# Patient Record
Sex: FEMALE | Race: WHITE | NOT HISPANIC OR LATINO | Employment: FULL TIME | URBAN - METROPOLITAN AREA
[De-identification: names, ages, dates, MRNs, and addresses within clinical notes are randomized per-mention and may not be internally consistent; named-entity substitution may affect disease eponyms.]

---

## 2017-01-22 ENCOUNTER — HOSPITAL ENCOUNTER (EMERGENCY)
Facility: MEDICAL CENTER | Age: 52
End: 2017-01-22
Attending: EMERGENCY MEDICINE
Payer: COMMERCIAL

## 2017-01-22 VITALS
RESPIRATION RATE: 14 BRPM | TEMPERATURE: 97.2 F | HEART RATE: 85 BPM | OXYGEN SATURATION: 98 % | DIASTOLIC BLOOD PRESSURE: 87 MMHG | SYSTOLIC BLOOD PRESSURE: 123 MMHG | WEIGHT: 190.26 LBS | HEIGHT: 65 IN | BODY MASS INDEX: 31.7 KG/M2

## 2017-01-22 DIAGNOSIS — L03.313 CELLULITIS OF CHEST WALL: ICD-10-CM

## 2017-01-22 LAB
BASOPHILS # BLD AUTO: 0.4 % (ref 0–1.8)
BASOPHILS # BLD: 0.03 K/UL (ref 0–0.12)
EOSINOPHIL # BLD AUTO: 0.08 K/UL (ref 0–0.51)
EOSINOPHIL NFR BLD: 1 % (ref 0–6.9)
ERYTHROCYTE [DISTWIDTH] IN BLOOD BY AUTOMATED COUNT: 44.2 FL (ref 35.9–50)
HCT VFR BLD AUTO: 37 % (ref 37–47)
HGB BLD-MCNC: 11.9 G/DL (ref 12–16)
IMM GRANULOCYTES # BLD AUTO: 0.03 K/UL (ref 0–0.11)
IMM GRANULOCYTES NFR BLD AUTO: 0.4 % (ref 0–0.9)
LYMPHOCYTES # BLD AUTO: 1.24 K/UL (ref 1–4.8)
LYMPHOCYTES NFR BLD: 15.5 % (ref 22–41)
MCH RBC QN AUTO: 26.8 PG (ref 27–33)
MCHC RBC AUTO-ENTMCNC: 32.2 G/DL (ref 33.6–35)
MCV RBC AUTO: 83.3 FL (ref 81.4–97.8)
MONOCYTES # BLD AUTO: 0.52 K/UL (ref 0–0.85)
MONOCYTES NFR BLD AUTO: 6.5 % (ref 0–13.4)
NEUTROPHILS # BLD AUTO: 6.08 K/UL (ref 2–7.15)
NEUTROPHILS NFR BLD: 76.2 % (ref 44–72)
NRBC # BLD AUTO: 0 K/UL
NRBC BLD AUTO-RTO: 0 /100 WBC
PLATELET # BLD AUTO: 334 K/UL (ref 164–446)
PMV BLD AUTO: 8.8 FL (ref 9–12.9)
RBC # BLD AUTO: 4.44 M/UL (ref 4.2–5.4)
WBC # BLD AUTO: 8 K/UL (ref 4.8–10.8)

## 2017-01-22 PROCEDURE — 700111 HCHG RX REV CODE 636 W/ 250 OVERRIDE (IP): Performed by: EMERGENCY MEDICINE

## 2017-01-22 PROCEDURE — 85025 COMPLETE CBC W/AUTO DIFF WBC: CPT

## 2017-01-22 PROCEDURE — 87040 BLOOD CULTURE FOR BACTERIA: CPT | Mod: 91

## 2017-01-22 PROCEDURE — 99284 EMERGENCY DEPT VISIT MOD MDM: CPT

## 2017-01-22 PROCEDURE — 96365 THER/PROPH/DIAG IV INF INIT: CPT

## 2017-01-22 PROCEDURE — 700105 HCHG RX REV CODE 258: Performed by: EMERGENCY MEDICINE

## 2017-01-22 PROCEDURE — 36415 COLL VENOUS BLD VENIPUNCTURE: CPT

## 2017-01-22 RX ORDER — DESVENLAFAXINE 100 MG/1
100 TABLET, EXTENDED RELEASE ORAL DAILY
COMMUNITY

## 2017-01-22 RX ORDER — TAMOXIFEN CITRATE 20 MG/1
20 TABLET ORAL DAILY
COMMUNITY

## 2017-01-22 RX ORDER — CEFTRIAXONE 500 MG/1
500 INJECTION, POWDER, FOR SOLUTION INTRAMUSCULAR; INTRAVENOUS ONCE
Status: SHIPPED | COMMUNITY
End: 2017-03-31

## 2017-01-22 RX ORDER — CEPHALEXIN 500 MG/1
500 CAPSULE ORAL 4 TIMES DAILY
Qty: 40 CAP | Refills: 0 | Status: SHIPPED | OUTPATIENT
Start: 2017-01-22 | End: 2017-02-01

## 2017-01-22 RX ORDER — MORPHINE SULFATE 4 MG/ML
4 INJECTION, SOLUTION INTRAMUSCULAR; INTRAVENOUS ONCE
Status: DISCONTINUED | OUTPATIENT
Start: 2017-01-22 | End: 2017-01-22 | Stop reason: HOSPADM

## 2017-01-22 RX ORDER — AMPICILLIN AND SULBACTAM 2; 1 G/1; G/1
3 INJECTION, POWDER, FOR SOLUTION INTRAMUSCULAR; INTRAVENOUS ONCE
Status: COMPLETED | OUTPATIENT
Start: 2017-01-22 | End: 2017-01-22

## 2017-01-22 RX ORDER — CEPHALEXIN 500 MG/1
500 CAPSULE ORAL 4 TIMES DAILY
Status: SHIPPED | COMMUNITY
End: 2017-03-31

## 2017-01-22 RX ORDER — SODIUM CHLORIDE 9 MG/ML
1000 INJECTION, SOLUTION INTRAVENOUS ONCE
Status: COMPLETED | OUTPATIENT
Start: 2017-01-22 | End: 2017-01-22

## 2017-01-22 RX ADMIN — SODIUM CHLORIDE 1000 ML: 900 INJECTION, SOLUTION INTRAVENOUS at 12:12

## 2017-01-22 RX ADMIN — AMPICILLIN SODIUM AND SULBACTAM SODIUM 3 G: 2; 1 INJECTION, POWDER, FOR SOLUTION INTRAMUSCULAR; INTRAVENOUS at 12:09

## 2017-01-22 ASSESSMENT — PAIN SCALES - GENERAL: PAINLEVEL_OUTOF10: 4

## 2017-01-22 NOTE — ED NOTES
Blood and BC x 2 drawn adn to lab.  NS/ IV abx infusing.  Pt watching TV in no distress w/ call light in reach.  Aware to call for any needs/changes.

## 2017-01-22 NOTE — ED NOTES
"Chief Complaint   Patient presents with   • Breast Pain     Left, started one week ago, has been seen here for same   • Breast Swelling     Left, started one week ago, treated w/ antibx     /98 mmHg  Pulse 95  Temp(Src) 36.2 °C (97.2 °F)  Resp 16  Ht 1.651 m (5' 5\")  Wt 86.3 kg (190 lb 4.1 oz)  BMI 31.66 kg/m2  SpO2 97%  LMP 01/18/2016     Pt states has been here several times before and receives antibx treatment for Cellulitis  "

## 2017-01-22 NOTE — DISCHARGE INSTRUCTIONS
Return immediately to the emergency department at the first sign of worsening infection including increasing redness, increasing pain, pus in the wound, fever or if you have any other concerns.      Cellulitis  Cellulitis is an infection of the skin and the tissue beneath it. The infected area is usually red and tender. Cellulitis occurs most often in the arms and lower legs.   CAUSES   Cellulitis is caused by bacteria that enter the skin through cracks or cuts in the skin. The most common types of bacteria that cause cellulitis are staphylococci and streptococci.  SIGNS AND SYMPTOMS   · Redness and warmth.  · Swelling.  · Tenderness or pain.  · Fever.  DIAGNOSIS   Your health care provider can usually determine what is wrong based on a physical exam. Blood tests may also be done.  TREATMENT   Treatment usually involves taking an antibiotic medicine.  HOME CARE INSTRUCTIONS   · Take your antibiotic medicine as directed by your health care provider. Finish the antibiotic even if you start to feel better.  · Keep the infected arm or leg elevated to reduce swelling.  · Apply a warm cloth to the affected area up to 4 times per day to relieve pain.  · Take medicines only as directed by your health care provider.  · Keep all follow-up visits as directed by your health care provider.  SEEK MEDICAL CARE IF:   · You notice red streaks coming from the infected area.  · Your red area gets larger or turns dark in color.  · Your bone or joint underneath the infected area becomes painful after the skin has healed.  · Your infection returns in the same area or another area.  · You notice a swollen bump in the infected area.  · You develop new symptoms.  · You have a fever.  SEEK IMMEDIATE MEDICAL CARE IF:   · You feel very sleepy.  · You develop vomiting or diarrhea.  · You have a general ill feeling (malaise) with muscle aches and pains.  MAKE SURE YOU:   · Understand these instructions.  · Will watch your condition.  · Will get  help right away if you are not doing well or get worse.     This information is not intended to replace advice given to you by your health care provider. Make sure you discuss any questions you have with your health care provider.     Document Released: 09/27/2006 Document Revised: 01/08/2016 Document Reviewed: 03/04/2013  Elsevier Interactive Patient Education ©2016 Elsevier Inc.

## 2017-01-22 NOTE — ED AVS SNAPSHOT
1/22/2017          Yina Cortés  [address]  [place] [address]    Dear Yina:    [facility] wants to ensure your discharge home is safe and you or your loved ones have had all your questions answered regarding your care after you leave the hospital.    You may receive a telephone call within two days of your discharge.  This call is to make certain you understand your discharge instructions as well as ensure we provided you with the best care possible during your stay with us.     The call will only last approximately 3-5 minutes and will be done by a nurse.    Once again, we want to ensure your discharge home is safe and that you have a clear understanding of any next steps in your care.  If you have any questions or concerns, please do not hesitate to contact us, we are here for you.  Thank you for choosing [facility] for your healthcare needs.    Sincerely,    Earle Mohan    [facility]

## 2017-01-22 NOTE — ED AVS SNAPSHOT
After Visit Summary                                                                                                                Yina Cortés   MRN: [id number]    Department:  [facility], Emergency Dept   Date of Visit:  1/22/2017            [facility], Emergency Dept    [address]    [place] [address]    Phone:  [phone]      You were seen by     Adal Merida M.D.      Your Diagnosis Was     Cellulitis of chest wall     L03.313       These are the medications you received during your hospitalization from 01/22/2017 1028 to 01/22/2017 1314     Date/Time Order Dose Route Action    01/22/2017 1209 ampicillin/sulbactam (UNASYN) injection 3 g 3 g Intravenous Given    01/22/2017 1200 morphine (pf) 4 mg/ml injection 4 mg 4 mg Intravenous Refused    01/22/2017 1212 NS infusion 1,000 mL 1,000 mL Intravenous New Bag      Follow-up Information     1. Follow up with Ada Acharya M.D. In 1 day.    Specialty:  OB/Gyn    Contact information    [address]  [address]  [place] [id number] [phone]        Medication Information     Review all of your home medications and newly ordered medications with your primary doctor and/or pharmacist as soon as possible. Follow medication instructions as directed by your doctor and/or pharmacist.     Please keep your complete medication list with you and share with your physician. Update the information when medications are discontinued, doses are changed, or new medications (including over-the-counter products) are added; and carry medication information at all times in the event of emergency situations.               Medication List      ASK your doctor about these medications        Instructions    cefTRIAXone 500 MG Solr   Commonly known as:  ROCEPHIN    500 mg by Intramuscular route Once. On Wednesday 1/18/17 and 1/19/17   Dose:  500 mg       * cephALEXin 500 MG Caps   What changed:  Another medication with  the same name was added. Make sure you understand how and when to take each.   Commonly known as:  KEFLEX   Ask about: Which instructions should I use?    Take 500 mg by mouth 4 times a day. Start date: 1/15/2017 For 10 days   Dose:  500 mg       * cephALEXin 500 MG Caps   What changed:  You were already taking a medication with the same name, and this prescription was added. Make sure you understand how and when to take each.   Commonly known as:  KEFLEX   Ask about: Which instructions should I use?    Take 1 Cap by mouth 4 times a day for 10 days.   Dose:  500 mg       PRISTIQ 100 MG Tb24   Generic drug:  Desvenlafaxine Succinate    Take 100 mg by mouth every day.   Dose:  100 mg       tamoxifen 20 MG tablet   Commonly known as:  NOLVADEX    Take 20 mg by mouth every day.   Dose:  20 mg       * Notice:  This list has 2 medication(s) that are the same as other medications prescribed for you. Read the directions carefully, and ask your doctor or other care provider to review them with you.            Procedures and tests performed during your visit     Procedure/Test Number of Times Performed    BLOOD CULTURE 2    CBC WITH DIFFERENTIAL 1        Discharge Instructions       Return immediately to the emergency department at the first sign of worsening infection including increasing redness, increasing pain, pus in the wound, fever or if you have any other concerns.      Cellulitis  Cellulitis is an infection of the skin and the tissue beneath it. The infected area is usually red and tender. Cellulitis occurs most often in the arms and lower legs.   CAUSES   Cellulitis is caused by bacteria that enter the skin through cracks or cuts in the skin. The most common types of bacteria that cause cellulitis are staphylococci and streptococci.  SIGNS AND SYMPTOMS   · Redness and warmth.  · Swelling.  · Tenderness or pain.  · Fever.  DIAGNOSIS   Your health care provider can usually determine what is wrong based on a physical  exam. Blood tests may also be done.  TREATMENT   Treatment usually involves taking an antibiotic medicine.  HOME CARE INSTRUCTIONS   · Take your antibiotic medicine as directed by your health care provider. Finish the antibiotic even if you start to feel better.  · Keep the infected arm or leg elevated to reduce swelling.  · Apply a warm cloth to the affected area up to 4 times per day to relieve pain.  · Take medicines only as directed by your health care provider.  · Keep all follow-up visits as directed by your health care provider.  SEEK MEDICAL CARE IF:   · You notice red streaks coming from the infected area.  · Your red area gets larger or turns dark in color.  · Your bone or joint underneath the infected area becomes painful after the skin has healed.  · Your infection returns in the same area or another area.  · You notice a swollen bump in the infected area.  · You develop new symptoms.  · You have a fever.  SEEK IMMEDIATE MEDICAL CARE IF:   · You feel very sleepy.  · You develop vomiting or diarrhea.  · You have a general ill feeling (malaise) with muscle aches and pains.  MAKE SURE YOU:   · Understand these instructions.  · Will watch your condition.  · Will get help right away if you are not doing well or get worse.     This information is not intended to replace advice given to you by your health care provider. Make sure you discuss any questions you have with your health care provider.     Document Released: 09/27/2006 Document Revised: 01/08/2016 Document Reviewed: 03/04/2013  Elsevier Interactive Patient Education ©2016 [employer]            Patient Information     Patient Information    Following emergency treatment: all patient requiring follow-up care must return either to a private physician or a clinic if your condition worsens before you are able to obtain further medical attention, please return to the emergency room.     Billing Information    At [facility], we work to make the billing  process streamlined for our patients.  Our Representatives are here to answer any questions you may have regarding your hospital bill.  If you have insurance coverage and have supplied your insurance information to us, we will submit a claim to your insurer on your behalf.  Should you have any questions regarding your bill, we can be reached online or by phone as follows:  Online: You are able pay your bills online or live chat with our representatives about any billing questions you may have. We are here to help Monday - Friday from 8:00am to 7:30pm and 9:00am - 12:00pm on Saturdays.  Please visit [email or web address]  for more information.   Phone:  [phone] or [phone]    Please note that your emergency physician, surgeon, pathologist, radiologist, anesthesiologist, and other specialists are not employed by [facility] and will therefore bill separately for their services.  Please contact them directly for any questions concerning their bills at the numbers below:     Emergency Physician Services:  [phone]  [facility]:  [phone]  [facility]:  [phone]  [facility]:  [phone]    1. Your final bill may vary from the amount quoted upon discharge if all procedures are not complete at that time, or if your doctor has additional procedures of which we are not aware. You will receive an additional bill if you return to the Emergency Department at [facility] for suture removal regardless of the facility of which the sutures were placed.     2. Please arrange for settlement of this account at the emergency registration.    3. All self-pay accounts are due in full at the time of treatment.  If you are unable to meet this obligation then payment is expected within 4-5 days.     4. If you have had radiology studies (CT, X-ray, Ultrasound, MRI), you have received a preliminary result during your emergency department  visit. Please contact the radiology department [phone] to receive a copy of your final result. Please discuss the Final result with your primary physician or with the follow up physician provided.     Crisis Hotline:  [place] Crisis Hotline:  4-491-USGBMCO or 1-211.626.1311  [place] Crisis Hotline:    1-492.902.9763 or 478-670-1459         ED Discharge Follow Up Questions    1. In order to provide you with very good care, we would like to follow up with a phone call in the next few days.  May we have your permission to contact you?     YES /  NO    2. What is the best phone number to call you? (       )_____-__________    3. What is the best time to call you?      Morning  /  Afternoon  /  Evening                   Patient Signature:  ____________________________________________________________    Date:  ____________________________________________________________

## 2017-01-22 NOTE — ED AVS SNAPSHOT
zlien Access Code: [email or web address]  Expires: 2/21/2017  1:14 PM    zlien  A secure, online tool to manage your health information     [employer]’s zlien® is a secure, online tool that connects you to your personalized health information from the privacy of your home -- day or night - making it very easy for you to manage your healthcare. Once the activation process is completed, you can even access your medical information using the zlien luz, which is available for free in the Apple Luz store or Google Play store.     zlien provides the following levels of access (as shown below):   My Chart Features   [facility] Primary Care Doctor [facility]  Specialists [facility]  Urgent  Care Non-[facility]  Primary Care  Doctor   Email your healthcare team securely and privately 24/7 X X X X   Manage appointments: schedule your next appointment; view details of past/upcoming appointments X      Request prescription refills. X      View recent personal medical records, including lab and immunizations X X X X   View health record, including health history, allergies, medications X X X X   Read reports about your outpatient visits, procedures, consult and ER notes X X X X   See your discharge summary, which is a recap of your hospital and/or ER visit that includes your diagnosis, lab results, and care plan. X X       How to register for zlien:  1. Go to  [email or web address].  2. Click on the Sign Up Now box, which takes you to the New Member Sign Up page. You will need to provide the following information:  a. Enter your zlien Access Code exactly as it appears at the top of this page. (You will not need to use this code after you’ve completed the sign-up process. If you do not sign up before the expiration date, you must request a new code.)   b. Enter your date of birth.   c. Enter your home email address.   d. Click Submit, and follow the next screen’s instructions.  3. Create a zlien ID. This will be your zlien  login ID and cannot be changed, so think of one that is secure and easy to remember.  4. Create a [employer] password. You can change your password at any time.  5. Enter your Password Reset Question and Answer. This can be used at a later time if you forget your password.   6. Enter your e-mail address. This allows you to receive e-mail notifications when new information is available in [employer].  7. Click Sign Up. You can now view your health information.    For assistance activating your [employer] account, call [phone]

## 2017-01-22 NOTE — ED PROVIDER NOTES
"ED Provider Note    CHIEF COMPLAINT  Chief Complaint   Patient presents with   • Breast Pain     Left, started one week ago, has been seen here for same   • Breast Swelling     Left, started one week ago, treated w/ antibx       HPI  Yina Cortés is a 51 y.o. female who presents with bilateral breast redness and swelling and pain, she has a history of recurring cellulitis of her chest wall after bilateral mastectomy and reconstruction status post breast cancer. She's been treated in the past with Keflex with successful improvement of her symptoms. She states that her current infection seems examined on on for about a week or so and is actually somewhat improving. She has an appointment with Dr. Bernal, her surgeon, in 2 days. No fever, no vomiting and no other complaints offered by the patient at this time.    REVIEW OF SYSTEMS  Negative for fever, chest pain, dyspnea, abdominal pain, back pain. All other systems are negative.     PAST MEDICAL HISTORY   has a past medical history of Anesthesia; Cancer ([facility]) (08/2015); Psychiatric problem; and Cellulitis.    SOCIAL HISTORY  Social History     Social History Main Topics   • Smoking status: Never Smoker    • Smokeless tobacco: Never Used   • Alcohol Use: No   • Drug Use: No   • Sexual Activity: Not on file       SURGICAL HISTORY   has past surgical history that includes breast biopsy; mastectomy (Bilateral, 09/15/15); other; tissue expander place/remove (Bilateral, 2/25/2016); breast implant revision (Bilateral, 2/25/2016); breast reconstruction (Bilateral, 2/25/2016); and cath removal (Right, 2/25/2016).    CURRENT MEDICATIONS  I personally reviewed the medication list in the charting documentation.     ALLERGIES  No Known Allergies    PHYSICAL EXAM  VITAL SIGNS: /98 mmHg  Pulse 95  Temp(Src) 36.2 °C (97.2 °F)  Resp 16  Ht 1.651 m (5' 5\")  Wt 86.3 kg (190 lb 4.1 oz)  BMI 31.66 kg/m2  SpO2 97%  LMP 01/18/2016  Constitutional: Alert in no apparent " distress.  HENT: No signs of trauma.   Eyes: Conjunctiva normal, Non-icteric.   Chest: Erythema of both breasts, postsurgical changes noted with reconstruction. Warm to the touch, no fluctuance  Skin: No erythema, No rash.   Musculoskeletal: Good range of motion in all major joints.   Neurologic: Alert, No focal deficits noted.   Psychiatric: Affect normal, Judgment normal.    DIAGNOSTIC STUDIES / PROCEDURES    LABS  Results for orders placed or performed during the hospital encounter of 01/22/17   CBC WITH DIFFERENTIAL   Result Value Ref Range    WBC 8.0 4.8 - 10.8 K/uL    RBC 4.44 4.20 - 5.40 M/uL    Hemoglobin 11.9 (L) 12.0 - 16.0 g/dL    Hematocrit 37.0 37.0 - 47.0 %    MCV 83.3 81.4 - 97.8 fL    MCH 26.8 (L) 27.0 - 33.0 pg    MCHC 32.2 (L) 33.6 - 35.0 g/dL    RDW 44.2 35.9 - 50.0 fL    Platelet Count 334 164 - 446 K/uL    MPV 8.8 (L) 9.0 - 12.9 fL    Neutrophils-Polys 76.20 (H) 44.00 - 72.00 %    Lymphocytes 15.50 (L) 22.00 - 41.00 %    Monocytes 6.50 0.00 - 13.40 %    Eosinophils 1.00 0.00 - 6.90 %    Basophils 0.40 0.00 - 1.80 %    Immature Granulocytes 0.40 0.00 - 0.90 %    Nucleated RBC 0.00 /100 WBC    Neutrophils (Absolute) 6.08 2.00 - 7.15 K/uL    Lymphs (Absolute) 1.24 1.00 - 4.80 K/uL    Monos (Absolute) 0.52 0.00 - 0.85 K/uL    Eos (Absolute) 0.08 0.00 - 0.51 K/uL    Baso (Absolute) 0.03 0.00 - 0.12 K/uL    Immature Granulocytes (abs) 0.03 0.00 - 0.11 K/uL    NRBC (Absolute) 0.00 K/uL       COURSE & MEDICAL DECISION MAKING  Pertinent Labs & Imaging studies reviewed. (See chart for details)    Encounter Summary: This is a 51 y.o. female with bilateral breast cellulitis, this is been a recurring problem since her mastectomy and secondary reconstruction, she states her symptoms have been improving over the past couple days and she does have an appointment with her plastic surgeon in a couple days. She is afebrile, she is not tachycardic, her white count is normal. Treated with a dose of IV Unasyn here in  the emergency department and she'll be discharged with prescription for Keflex which has helped in the past. Strict return injections given, stable for discharge      DISPOSITION: Discharge Home      FINAL IMPRESSION  1. Cellulitis of chest wall        This dictation was created using voice recognition software. The accuracy of the dictation is limited to the abilities of the software. I expect there may be some errors of grammar and possibly content. The nursing notes were reviewed and certain aspects of this information were incorporated into this note.    Electronically signed by: Adal Merida, 1/22/2017 12:07 PM

## 2017-01-24 ENCOUNTER — HOSPITAL ENCOUNTER (EMERGENCY)
Facility: MEDICAL CENTER | Age: 52
End: 2017-01-24
Attending: EMERGENCY MEDICINE
Payer: COMMERCIAL

## 2017-01-24 VITALS
HEIGHT: 65 IN | RESPIRATION RATE: 18 BRPM | BODY MASS INDEX: 31.11 KG/M2 | OXYGEN SATURATION: 98 % | TEMPERATURE: 98.2 F | SYSTOLIC BLOOD PRESSURE: 136 MMHG | HEART RATE: 108 BPM | DIASTOLIC BLOOD PRESSURE: 89 MMHG | WEIGHT: 186.73 LBS

## 2017-01-24 DIAGNOSIS — N61.0 CELLULITIS OF FEMALE BREAST: ICD-10-CM

## 2017-01-24 LAB
ANION GAP SERPL CALC-SCNC: 8 MMOL/L (ref 0–11.9)
BASOPHILS # BLD AUTO: 0.7 % (ref 0–1.8)
BASOPHILS # BLD: 0.06 K/UL (ref 0–0.12)
BUN SERPL-MCNC: 12 MG/DL (ref 8–22)
CALCIUM SERPL-MCNC: 9 MG/DL (ref 8.4–10.2)
CHLORIDE SERPL-SCNC: 100 MMOL/L (ref 96–112)
CO2 SERPL-SCNC: 28 MMOL/L (ref 20–33)
CREAT SERPL-MCNC: 0.63 MG/DL (ref 0.5–1.4)
EOSINOPHIL # BLD AUTO: 0.14 K/UL (ref 0–0.51)
EOSINOPHIL NFR BLD: 1.6 % (ref 0–6.9)
ERYTHROCYTE [DISTWIDTH] IN BLOOD BY AUTOMATED COUNT: 42.7 FL (ref 35.9–50)
GFR SERPL CREATININE-BSD FRML MDRD: >60 ML/MIN/1.73 M 2
GLUCOSE SERPL-MCNC: 134 MG/DL (ref 65–99)
HCT VFR BLD AUTO: 38.5 % (ref 37–47)
HGB BLD-MCNC: 12.4 G/DL (ref 12–16)
IMM GRANULOCYTES # BLD AUTO: 0.04 K/UL (ref 0–0.11)
IMM GRANULOCYTES NFR BLD AUTO: 0.5 % (ref 0–0.9)
LYMPHOCYTES # BLD AUTO: 1.36 K/UL (ref 1–4.8)
LYMPHOCYTES NFR BLD: 15.8 % (ref 22–41)
MCH RBC QN AUTO: 26.6 PG (ref 27–33)
MCHC RBC AUTO-ENTMCNC: 32.2 G/DL (ref 33.6–35)
MCV RBC AUTO: 82.4 FL (ref 81.4–97.8)
MONOCYTES # BLD AUTO: 0.47 K/UL (ref 0–0.85)
MONOCYTES NFR BLD AUTO: 5.5 % (ref 0–13.4)
NEUTROPHILS # BLD AUTO: 6.55 K/UL (ref 2–7.15)
NEUTROPHILS NFR BLD: 75.9 % (ref 44–72)
NRBC # BLD AUTO: 0 K/UL
NRBC BLD AUTO-RTO: 0 /100 WBC
PLATELET # BLD AUTO: 416 K/UL (ref 164–446)
PMV BLD AUTO: 8.4 FL (ref 9–12.9)
POTASSIUM SERPL-SCNC: 3.4 MMOL/L (ref 3.6–5.5)
RBC # BLD AUTO: 4.67 M/UL (ref 4.2–5.4)
SODIUM SERPL-SCNC: 136 MMOL/L (ref 135–145)
WBC # BLD AUTO: 8.6 K/UL (ref 4.8–10.8)

## 2017-01-24 PROCEDURE — 36415 COLL VENOUS BLD VENIPUNCTURE: CPT

## 2017-01-24 PROCEDURE — 700111 HCHG RX REV CODE 636 W/ 250 OVERRIDE (IP): Performed by: EMERGENCY MEDICINE

## 2017-01-24 PROCEDURE — 99284 EMERGENCY DEPT VISIT MOD MDM: CPT

## 2017-01-24 PROCEDURE — 96372 THER/PROPH/DIAG INJ SC/IM: CPT

## 2017-01-24 PROCEDURE — 85025 COMPLETE CBC W/AUTO DIFF WBC: CPT

## 2017-01-24 PROCEDURE — A9270 NON-COVERED ITEM OR SERVICE: HCPCS | Performed by: EMERGENCY MEDICINE

## 2017-01-24 PROCEDURE — 700102 HCHG RX REV CODE 250 W/ 637 OVERRIDE(OP): Performed by: EMERGENCY MEDICINE

## 2017-01-24 PROCEDURE — 80048 BASIC METABOLIC PNL TOTAL CA: CPT

## 2017-01-24 RX ORDER — SULFAMETHOXAZOLE AND TRIMETHOPRIM 800; 160 MG/1; MG/1
1 TABLET ORAL EVERY 12 HOURS
Qty: 20 TAB | Refills: 0 | Status: SHIPPED | OUTPATIENT
Start: 2017-01-24 | End: 2017-02-03

## 2017-01-24 RX ORDER — SULFAMETHOXAZOLE AND TRIMETHOPRIM 800; 160 MG/1; MG/1
1 TABLET ORAL ONCE
Status: COMPLETED | OUTPATIENT
Start: 2017-01-24 | End: 2017-01-24

## 2017-01-24 RX ORDER — CEFTRIAXONE 1 G/1
1000 INJECTION, POWDER, FOR SOLUTION INTRAMUSCULAR; INTRAVENOUS ONCE
Status: COMPLETED | OUTPATIENT
Start: 2017-01-24 | End: 2017-01-24

## 2017-01-24 RX ADMIN — CEFTRIAXONE 1000 MG: 1 INJECTION, POWDER, FOR SOLUTION INTRAMUSCULAR; INTRAVENOUS at 22:21

## 2017-01-24 RX ADMIN — SULFAMETHOXAZOLE AND TRIMETHOPRIM 1 TABLET: 800; 160 TABLET ORAL at 22:21

## 2017-01-24 ASSESSMENT — PAIN SCALES - GENERAL: PAINLEVEL_OUTOF10: 0

## 2017-01-24 NOTE — ED AVS SNAPSHOT
[employer] Access Code: [email or web address]  Expires: 2/21/2017  1:14 PM    [employer]  A secure, online tool to manage your health information     I Do Now I Don't’s [employer]® is a secure, online tool that connects you to your personalized health information from the privacy of your home -- day or night - making it very easy for you to manage your healthcare. Once the activation process is completed, you can even access your medical information using the [employer] luz, which is available for free in the Apple Luz store or Google Play store.     [employer] provides the following levels of access (as shown below):   My Chart Features   [facility] Primary Care Doctor [facility]  Specialists [facility]  Urgent  Care Non-[facility]  Primary Care  Doctor   Email your healthcare team securely and privately 24/7 X X X X   Manage appointments: schedule your next appointment; view details of past/upcoming appointments X      Request prescription refills. X      View recent personal medical records, including lab and immunizations X X X X   View health record, including health history, allergies, medications X X X X   Read reports about your outpatient visits, procedures, consult and ER notes X X X X   See your discharge summary, which is a recap of your hospital and/or ER visit that includes your diagnosis, lab results, and care plan. X X       How to register for [employer]:  1. Go to  [email or web address].  2. Click on the Sign Up Now box, which takes you to the New Member Sign Up page. You will need to provide the following information:  a. Enter your [employer] Access Code exactly as it appears at the top of this page. (You will not need to use this code after you’ve completed the sign-up process. If you do not sign up before the expiration date, you must request a new code.)   b. Enter your date of birth.   c. Enter your home email address.   d. Click Submit, and follow the next screen’s instructions.  3. Create a [employer] ID. This will be your [employer]  login ID and cannot be changed, so think of one that is secure and easy to remember.  4. Create a [employer] password. You can change your password at any time.  5. Enter your Password Reset Question and Answer. This can be used at a later time if you forget your password.   6. Enter your e-mail address. This allows you to receive e-mail notifications when new information is available in [employer].  7. Click Sign Up. You can now view your health information.    For assistance activating your [employer] account, call [phone]

## 2017-01-24 NOTE — ED AVS SNAPSHOT
After Visit Summary                                                                                                                Yina Cortés   MRN: [id number]    Department:  [facility], Emergency Dept   Date of Visit:  1/24/2017            [facility], Emergency Dept    [address]    [place] [address]    Phone:  [phone]      You were seen by     Earle Bell M.D.      Your Diagnosis Was     Cellulitis of female breast     N61.0       Follow-up Information     1. Follow up with Ada Acharya M.D..    Specialty:  OB/Gyn    Contact information    [address]  [place] [id number] [phone]        Medication Information     Review all of your home medications and newly ordered medications with your primary doctor and/or pharmacist as soon as possible. Follow medication instructions as directed by your doctor and/or pharmacist.     Please keep your complete medication list with you and share with your physician. Update the information when medications are discontinued, doses are changed, or new medications (including over-the-counter products) are added; and carry medication information at all times in the event of emergency situations.               Medication List      START taking these medications        Instructions    sulfamethoxazole-trimethoprim 800-160 MG tablet   Commonly known as:  BACTRIM DS    Take 1 Tab by mouth every 12 hours for 10 days.   Dose:  1 Tab         ASK your doctor about these medications        Instructions    cefTRIAXone 500 MG Solr   Commonly known as:  ROCEPHIN    500 mg by Intramuscular route Once. On Wednesday 1/18/17 and 1/19/17   Dose:  500 mg       * cephALEXin 500 MG Caps   Commonly known as:  KEFLEX    Take 500 mg by mouth 4 times a day. Start date: 1/15/2017 For 10 days   Dose:  500 mg       * cephALEXin 500 MG Caps   Commonly known as:  KEFLEX    Take 1 Cap by mouth 4 times a day for 10  days.   Dose:  500 mg       PRISTIQ 100 MG Tb24   Generic drug:  Desvenlafaxine Succinate    Take 100 mg by mouth every day.   Dose:  100 mg       tamoxifen 20 MG tablet   Commonly known as:  NOLVADEX    Take 20 mg by mouth every day.   Dose:  20 mg       * Notice:  This list has 2 medication(s) that are the same as other medications prescribed for you. Read the directions carefully, and ask your doctor or other care provider to review them with you.            Procedures and tests performed during your visit     BASIC METABOLIC PANEL    CBC WITH DIFFERENTIAL    ESTIMATED GFR        Discharge Instructions       Cellulitis  Cellulitis is an infection of the skin and the tissue beneath it. The infected area is usually red and tender. Cellulitis occurs most often in the arms and lower legs.   CAUSES   Cellulitis is caused by bacteria that enter the skin through cracks or cuts in the skin. The most common types of bacteria that cause cellulitis are staphylococci and streptococci.  SIGNS AND SYMPTOMS   · Redness and warmth.  · Swelling.  · Tenderness or pain.  · Fever.  DIAGNOSIS   Your health care provider can usually determine what is wrong based on a physical exam. Blood tests may also be done.  TREATMENT   Treatment usually involves taking an antibiotic medicine.  HOME CARE INSTRUCTIONS   · Take your antibiotic medicine as directed by your health care provider. Finish the antibiotic even if you start to feel better.  · Keep the infected arm or leg elevated to reduce swelling.  · Apply a warm cloth to the affected area up to 4 times per day to relieve pain.  · Take medicines only as directed by your health care provider.  · Keep all follow-up visits as directed by your health care provider.  SEEK MEDICAL CARE IF:   · You notice red streaks coming from the infected area.  · Your red area gets larger or turns dark in color.  · Your bone or joint underneath the infected area becomes painful after the skin has  healed.  · Your infection returns in the same area or another area.  · You notice a swollen bump in the infected area.  · You develop new symptoms.  · You have a fever.  SEEK IMMEDIATE MEDICAL CARE IF:   · You feel very sleepy.  · You develop vomiting or diarrhea.  · You have a general ill feeling (malaise) with muscle aches and pains.  MAKE SURE YOU:   · Understand these instructions.  · Will watch your condition.  · Will get help right away if you are not doing well or get worse.     This information is not intended to replace advice given to you by your health care provider. Make sure you discuss any questions you have with your health care provider.     Document Released: 09/27/2006 Document Revised: 01/08/2016 Document Reviewed: 03/04/2013  [employer] Interactive Patient Education ©2016 [employer]            Patient Information     Patient Information    Following emergency treatment: all patient requiring follow-up care must return either to a private physician or a clinic if your condition worsens before you are able to obtain further medical attention, please return to the emergency room.     Billing Information    At [facility], we work to make the billing process streamlined for our patients.  Our Representatives are here to answer any questions you may have regarding your hospital bill.  If you have insurance coverage and have supplied your insurance information to us, we will submit a claim to your insurer on your behalf.  Should you have any questions regarding your bill, we can be reached online or by phone as follows:  Online: You are able pay your bills online or live chat with our representatives about any billing questions you may have. We are here to help Monday - Friday from 8:00am to 7:30pm and 9:00am - 12:00pm on Saturdays.  Please visit [email or web address]  for more information.   Phone:  [phone] or [phone]    Please note that your emergency  physician, surgeon, pathologist, radiologist, anesthesiologist, and other specialists are not employed by [facility] and will therefore bill separately for their services.  Please contact them directly for any questions concerning their bills at the numbers below:     Emergency Physician Services:  [phone]  [facility]:  [phone]  [facility]:  [phone]  [facility]:  [phone]    1. Your final bill may vary from the amount quoted upon discharge if all procedures are not complete at that time, or if your doctor has additional procedures of which we are not aware. You will receive an additional bill if you return to the Emergency Department at [facility] for suture removal regardless of the facility of which the sutures were placed.     2. Please arrange for settlement of this account at the emergency registration.    3. All self-pay accounts are due in full at the time of treatment.  If you are unable to meet this obligation then payment is expected within 4-5 days.     4. If you have had radiology studies (CT, X-ray, Ultrasound, MRI), you have received a preliminary result during your emergency department visit. Please contact the radiology department [phone] to receive a copy of your final result. Please discuss the Final result with your primary physician or with the follow up physician provided.     Crisis Hotline:  [place] Crisis Hotline:  4-804-THAEVKF or 1-851.784.7818  [place] Crisis Hotline:    1-623.280.7299 or 577-842-3941         ED Discharge Follow Up Questions    1. In order to provide you with very good care, we would like to follow up with a phone call in the next few days.  May we have your permission to contact you?     YES /  NO    2. What is the best phone number to call you? (       )_____-__________    3. What is the best time to call you?      Morning  /  Afternoon  /  Evening                   Patient Signature:   ____________________________________________________________    Date:  ____________________________________________________________

## 2017-01-24 NOTE — ED AVS SNAPSHOT
1/24/2017          Yina Cortés  [address]  [place] [address]    Dear Yina:    [facility] wants to ensure your discharge home is safe and you or your loved ones have had all your questions answered regarding your care after you leave the hospital.    You may receive a telephone call within two days of your discharge.  This call is to make certain you understand your discharge instructions as well as ensure we provided you with the best care possible during your stay with us.     The call will only last approximately 3-5 minutes and will be done by a nurse.    Once again, we want to ensure your discharge home is safe and that you have a clear understanding of any next steps in your care.  If you have any questions or concerns, please do not hesitate to contact us, we are here for you.  Thank you for choosing [facility] for your healthcare needs.    Sincerely,    Earle Mohan    [facility]

## 2017-01-25 NOTE — ED NOTES
".  Chief Complaint   Patient presents with   • Wound Infection     pt came in to the ED on sunday 1/22/17 and diagnosed with cellulitis to L breast implant that was placed about 1 year ago. PCP sent pt to ED today because cellulitis \"is not getting better\" Pt states that wound still looks \"red and swollen\"     .Blood pressure 152/96, pulse 95, temperature 36.8 °C (98.2 °F), resp. rate 20, height 1.651 m (5' 5\"), weight 84.7 kg (186 lb 11.7 oz), last menstrual period 01/18/2016, SpO2 98 %.    "

## 2017-01-25 NOTE — ED PROVIDER NOTES
"ED Provider Note    ED Provider Note    Primary care provider: Ada Acharya M.D.  Means of arrival: POV  History obtained from: Patient    CHIEF COMPLAINT  Chief Complaint   Patient presents with   • Wound Infection     pt came in to the ED on sunday 1/22/17 and diagnosed with cellulitis to L breast implant that was placed about 1 year ago. PCP sent pt to ED today because cellulitis \"is not getting better\" Pt states that wound still looks \"red and swollen\"     Seen at 9:59 PM.   HPI  Yina Cortés is a 51 y.o. female who presents to the Emergency Department for recheck of her bilateral breast cellulitis. She is status post breast reconstruction after mastectomy for breast CA. She was seen in this emergency department 2 days ago and seen by her primary care physician 1 week ago. Initially she received 2 500 mg IM injections of Rocephin. She then was seen 2 days ago here, given additional Rocephin and discharged on Keflex. She believes that the rash is getting better, the pain also appears to be improving. She denies any fevers. She contacted her plastic surgeon who delayed her appointment for 2 more days. When talking to the nurse coordinator, they recommend she return to the emergency department because she would be at risk for losing the implant.    REVIEW OF SYSTEMS  See HPI,   denies fevers, dizziness, chest pain, shortness of breath, does endorse breast swelling and tenderness. Remainder of ROS negative.     PAST MEDICAL HISTORY   has a past medical history of Anesthesia; Cancer (CMS-HCC) (08/2015); Psychiatric problem; and Cellulitis.    SURGICAL HISTORY   has past surgical history that includes breast biopsy; mastectomy (Bilateral, 09/15/15); other; tissue expander place/remove (Bilateral, 2/25/2016); breast implant revision (Bilateral, 2/25/2016); breast reconstruction (Bilateral, 2/25/2016); and cath removal (Right, 2/25/2016).    SOCIAL HISTORY  Social History   Substance Use Topics   • Smoking " "status: Never Smoker    • Smokeless tobacco: Never Used   • Alcohol Use: No      History   Drug Use No       FAMILY HISTORY  History reviewed. No pertinent family history.    CURRENT MEDICATIONS  Reviewed.  See Encounter Summary.     ALLERGIES  No Known Allergies    PHYSICAL EXAM  VITAL SIGNS: /102 mmHg  Pulse 106  Temp(Src) 36.8 °C (98.2 °F)  Resp 20  Ht 1.651 m (5' 5\")  Wt 84.7 kg (186 lb 11.7 oz)  BMI 31.07 kg/m2  SpO2 98%  LMP 01/18/2016  Constitutional: Awake, alert in no apparent distress.  HENT: Normocephalic, Bilateral external ears normal. Nose normal.   Eyes: Conjunctiva normal, non-icteric, EOMI.    Thorax & Lungs: Easy unlabored respirations, Clear to ascultation bilaterally.  Cardiovascular: Regular rate, Regular rhythm, No murmurs, rubs or gallops.  Breast exam done with chaperone present, the left breast is slightly edematous compared to the right breast, both have erythema on the inferior 50% of the breast, there is slight increased in warmth. There is no axillary lymphadenopathy. There is mild tenderness to palpation. There is no underlying fluctuance or abscess appreciated.  The patient presents with a picture on her cell phone of the initial cellulitis, does appear improved from prior.  Abdomen:  Soft, nontender, nondistended, normal active bowel sounds.   :    Skin: Visualized skin is  Dry, No erythema, No rash.   Musculoskeletal:   No cyanosis, clubbing or edema.  Neurologic: Alert, Grossly non-focal.   Psychiatric: Normal affect, Normal mood  Lymphatic:  No cervical LAD       RADIOLOGY  No orders to display     The radiologist's interpretation of all radiological studies have been reviewed by me.    COURSE & MEDICAL DECISION MAKING  Pertinent Labs & Imaging studies reviewed. (See chart for details)        9:59 PM - Patient seen and examined at bedside.   Decision Making:  This is a 51 y.o. year old female who presents with bilateral breast cellulitis. It does appear to be " improving from prior. She does not have laboratory abnormalities, she is not septic. I have added Bactrim as she does not have coverage for MRSA but otherwise it believed that she is improving from the Keflex. I do not feel that she requires hospitalization, I do not feel that this presentation is indicative of a new malignancy such as inflammatory breast CA. She has follow-up with her plastic surgeon in 48 hours which is very appropriate. She is instructed to return for any fevers, worsening swelling, pain or redness.    The patient will be discharged home in good condition.    FINAL IMPRESSION  1. Cellulitis of female breast

## 2017-01-25 NOTE — ED NOTES
Rounded on pt in lobby. Rechecked vitals. No change in vital signs at this time. Pt informed this tech that pt has had bilateral mastectomy and BP will not read correctly on either arm.

## 2017-01-25 NOTE — ED NOTES
Discharge instructions and prescription information provided.  Pt verbalized the understanding of discharge instructions to follow up with PCP and to return to ER if condition worsens.  Pt ambulated out of ER without difficulty.

## 2017-01-27 LAB
BACTERIA BLD CULT: NORMAL
BACTERIA BLD CULT: NORMAL
SIGNIFICANT IND 70042: NORMAL
SIGNIFICANT IND 70042: NORMAL
SITE SITE: NORMAL
SITE SITE: NORMAL
SOURCE SOURCE: NORMAL
SOURCE SOURCE: NORMAL

## 2017-03-31 ENCOUNTER — HOSPITAL ENCOUNTER (OUTPATIENT)
Dept: RADIATION ONCOLOGY | Facility: MEDICAL CENTER | Age: 52
End: 2017-03-31
Attending: RADIOLOGY
Payer: COMMERCIAL

## 2017-03-31 PROCEDURE — 99213 OFFICE O/P EST LOW 20 MIN: CPT | Performed by: RADIOLOGY

## 2017-03-31 PROCEDURE — 99212 OFFICE O/P EST SF 10 MIN: CPT | Performed by: RADIOLOGY

## 2017-03-31 RX ORDER — SULFAMETHOXAZOLE AND TRIMETHOPRIM 400; 80 MG/1; MG/1
1 TABLET ORAL 2 TIMES DAILY
COMMUNITY

## 2017-03-31 NOTE — PROCEDURES
DATE OF SERVICE:  03/31/2017    HISTORY OF PRESENT ILLNESS:  She is 10 months status post radiation therapy to   the bilateral chest walls.  On the right, 4.5 cm 5 positive node breast   cancer, and on the left a 1.3 cm 1 positive lymph node breast cancer who is   status post AC Taxol.  She had the breast implants and modified radical   mastectomy prior to radiation therapy on 02/15/2016.  Unfortunately, she has   had cellulitis what appears to be about 3 times, she cannot remember exactly,   but she did have an episode at least last summer, then in the fall and then in   January and then more recently she had some redness where someone called in   Bactrim for her over the weekend and this has all specifically been on the   left chest wall.  She plans to see Dr. Bernal on Monday and will be seeing Dr. Munoz on Wednesday.  Otherwise, she said she is doing okay.  Energy is   good.  She is nervous about having to the cancer recur, which is not unusual,   but Dr. Munoz has put her on Pristiq along with the tamoxifen.  Her energy   is good.  Otherwise, she is doing okay without any major complaints.  She did   have a little bit of a back spasm when she was watching her kids while she was   sitting on bleachers.    REVIEW OF SYSTEMS:  Significant for muscle weakness and tenderness in the mid   back since she was sitting on the bleachers last week.  She does have hot   flashes and seasonal allergies.  The rest of the review of systems is negative   and is in the EMR.    PHYSICAL EXAMINATION:  GENERAL:  She has a KPS of 100.  VITAL SIGNS:  Weight 170, pulse 73, respirations 16, temperature 98.2, blood   pressure 186/110, O2 sat 96.  HEENT:  Normocephalic, atraumatic.  Sclerae are anicteric.  Extraocular   movements are intact.  Ears, nose, mouth within normal limits.  No   preauricular, neck, supraclavicular, axillary bismark adenopathy.  HEART:  Regular rate.  LUNGS:  Clear.  ABDOMEN:  Obese without  hepatosplenomegaly.  EXTREMITIES:  Without clubbing, cyanosis, or edema.  BACK:  On examination of the back, she draws attention to the pain that she   has and it does appear to be a right paraspinous muscle spasm.  No obvious   tumor.  It just looks like a muscle spasm.  BREASTS:  On examination of the reconstructed breasts, both have a lot of   telangiectatic changes consistent with post-radiation change.  The left one is   smaller.  It is firm on the lower outer quadrant region where there is a lot   of scar tissue.  The left one has a little bit more reddish hue, but she said   it has improved since she has been starting the Bactrim.  The right   reconstructed breast is more soft.  It does have telangiectatic changes and   firmness consistent with radiation, but no nodularity or mass.    IMPRESSION:  No evidence of tumor.    PLAN:  Follow up in 6-8 months.  I have told her that she needs to talk to Dr. Bernal about what to do about what is going on with the left implant.  She   understands that and I can see her sooner as needed.       ____________________________________     MD LIA MAHAN / TONEY    DD:  03/31/2017 16:12:46  DT:  03/31/2017 16:25:58    D#:  [id number]  Job#:  [id number]    cc: HATTIE FUENTES MD, EDENILSON BERNAL MD

## 2017-06-26 ENCOUNTER — HOSPITAL ENCOUNTER (EMERGENCY)
Facility: MEDICAL CENTER | Age: 52
End: 2017-06-27
Attending: EMERGENCY MEDICINE
Payer: COMMERCIAL

## 2017-06-26 VITALS
SYSTOLIC BLOOD PRESSURE: 182 MMHG | HEIGHT: 65 IN | WEIGHT: 155.2 LBS | OXYGEN SATURATION: 96 % | DIASTOLIC BLOOD PRESSURE: 107 MMHG | BODY MASS INDEX: 25.86 KG/M2 | RESPIRATION RATE: 16 BRPM | HEART RATE: 98 BPM | TEMPERATURE: 99.3 F

## 2017-06-26 DIAGNOSIS — N61.0 CELLULITIS OF LEFT BREAST: ICD-10-CM

## 2017-06-26 PROCEDURE — 99283 EMERGENCY DEPT VISIT LOW MDM: CPT

## 2017-06-26 PROCEDURE — 700111 HCHG RX REV CODE 636 W/ 250 OVERRIDE (IP): Performed by: EMERGENCY MEDICINE

## 2017-06-26 PROCEDURE — 96372 THER/PROPH/DIAG INJ SC/IM: CPT

## 2017-06-26 RX ORDER — ONDANSETRON 4 MG/1
4 TABLET, ORALLY DISINTEGRATING ORAL ONCE
Status: COMPLETED | OUTPATIENT
Start: 2017-06-27 | End: 2017-06-27

## 2017-06-26 RX ORDER — CLINDAMYCIN HYDROCHLORIDE 150 MG/1
300 CAPSULE ORAL ONCE
Status: COMPLETED | OUTPATIENT
Start: 2017-06-27 | End: 2017-06-27

## 2017-06-26 RX ORDER — ONDANSETRON 4 MG/1
4 TABLET, ORALLY DISINTEGRATING ORAL EVERY 8 HOURS PRN
Qty: 10 TAB | Refills: 0 | Status: SHIPPED | OUTPATIENT
Start: 2017-06-26

## 2017-06-26 RX ORDER — CEFTRIAXONE 2 G/1
2000 INJECTION, POWDER, FOR SOLUTION INTRAMUSCULAR; INTRAVENOUS ONCE
Status: COMPLETED | OUTPATIENT
Start: 2017-06-27 | End: 2017-06-27

## 2017-06-26 RX ORDER — CLINDAMYCIN HYDROCHLORIDE 300 MG/1
300 CAPSULE ORAL 3 TIMES DAILY
Qty: 30 CAP | Refills: 0 | Status: SHIPPED | OUTPATIENT
Start: 2017-06-26 | End: 2017-07-06

## 2017-06-26 RX ADMIN — CEFTRIAXONE 2 G: 2 INJECTION, POWDER, FOR SOLUTION INTRAMUSCULAR; INTRAVENOUS at 23:59

## 2017-06-26 ASSESSMENT — PAIN SCALES - GENERAL
PAINLEVEL_OUTOF10: 2
PAINLEVEL_OUTOF10: 3

## 2017-06-26 NOTE — ED AVS SNAPSHOT
6/27/2017    Yina Cortés  [address]  [place] [address]    Dear Yina:    [facility] wants to ensure your discharge home is safe and you or your loved ones have had all of your questions answered regarding your care after you leave the hospital.    Below is a list of resources and contact information should you have any questions regarding your hospital stay, follow-up instructions, or active medical symptoms.    Questions or Concerns Regarding… Contact   Medical Questions Related to Your Discharge  (7 days a week, 8am-5pm) Contact a Nurse Care Coordinator   [phone]   Medical Questions Not Related to Your Discharge  (24 hours a day / 7 days a week)  Contact the Nurse Health Line   [phone]    Medications or Discharge Instructions Refer to your discharge packet   or contact your [facility] Primary Care Provider   [phone]   Follow-up Appointment(s) Schedule your appointment via [employer]   or contact Scheduling [phone]   Billing Review your statement via [employer]  or contact Billing [phone]   Medical Records Review your records via [employer]   or contact Medical Records [phone]     You may receive a telephone call within two days of discharge. This call is to make certain you understand your discharge instructions and have the opportunity to have any questions answered. You can also easily access your medical information, test results and upcoming appointments via the [employer] free online health management tool. You can learn more and sign up at [facility]/[employer]. For assistance setting up your [employer] account, please call [phone].    Once again, we want to ensure your discharge home is safe and that you have a clear understanding of any next steps in your care. If you have any questions or concerns, please do not hesitate to contact us, we are here for you. Thank you for choosing [facility] for your healthcare needs.    Sincerely,    Your [facility] Healthcare Team

## 2017-06-26 NOTE — ED AVS SNAPSHOT
MD Insider Access Code: [email or web address]  Expires: 7/26/2017  3:55 AM    MD Insider  A secure, online tool to manage your health information     [employer]’s MD Insider® is a secure, online tool that connects you to your personalized health information from the privacy of your home -- day or night - making it very easy for you to manage your healthcare. Once the activation process is completed, you can even access your medical information using the MD Insider luz, which is available for free in the Apple Luz store or Google Play store.     MD Insider provides the following levels of access (as shown below):   My Chart Features   [facility] Primary Care Doctor [facility]  Specialists [facility]  Urgent  Care Non-[facility]  Primary Care  Doctor   Email your healthcare team securely and privately 24/7 X X X X   Manage appointments: schedule your next appointment; view details of past/upcoming appointments X      Request prescription refills. X      View recent personal medical records, including lab and immunizations X X X X   View health record, including health history, allergies, medications X X X X   Read reports about your outpatient visits, procedures, consult and ER notes X X X X   See your discharge summary, which is a recap of your hospital and/or ER visit that includes your diagnosis, lab results, and care plan. X X       How to register for MD Insider:  1. Go to  [email or web address].  2. Click on the Sign Up Now box, which takes you to the New Member Sign Up page. You will need to provide the following information:  a. Enter your MD Insider Access Code exactly as it appears at the top of this page. (You will not need to use this code after you’ve completed the sign-up process. If you do not sign up before the expiration date, you must request a new code.)   b. Enter your date of birth.   c. Enter your home email address.   d. Click Submit, and follow the next screen’s instructions.  3. Create a MD Insider ID. This will be your MD Insider  login ID and cannot be changed, so think of one that is secure and easy to remember.  4. Create a [employer] password. You can change your password at any time.  5. Enter your Password Reset Question and Answer. This can be used at a later time if you forget your password.   6. Enter your e-mail address. This allows you to receive e-mail notifications when new information is available in [employer].  7. Click Sign Up. You can now view your health information.    For assistance activating your [employer] account, call [phone]

## 2017-06-26 NOTE — ED AVS SNAPSHOT
Home Care Instructions                                                                                                                Yina Cortés   MRN: [id number]    Department:  [facility], Emergency Dept   Date of Visit:  6/26/2017            [facility], Emergency Dept    [address]    [place] [address]    Phone:  [phone]      You were seen by     Sheila Sanchez M.D.      Your Diagnosis Was     Cellulitis of left breast     N61.0       These are the medications you received during your hospitalization from 06/26/2017 2104 to 06/27/2017 0007     Date/Time Order Dose Route Action    06/26/2017 2359 cefTRIAXone (ROCEPHIN) injection 2 g 2 g Intramuscular Given    06/27/2017 0000 ondansetron (ZOFRAN ODT) dispertab 4 mg 4 mg Oral Given    06/27/2017 0000 clindamycin (CLEOCIN) capsule 300 mg 300 mg Oral Given      Follow-up Information     1. Follow up with Ada Acharya M.D.. Schedule an appointment as soon as possible for a visit in 2 days.    Specialty:  OB/Gyn    Contact information    [address]  [address]  [place] [address]  [phone]        Medication Information     Review all of your home medications and newly ordered medications with your primary doctor and/or pharmacist as soon as possible. Follow medication instructions as directed by your doctor and/or pharmacist.     Please keep your complete medication list with you and share with your physician. Update the information when medications are discontinued, doses are changed, or new medications (including over-the-counter products) are added; and carry medication information at all times in the event of emergency situations.               Medication List      START taking these medications        Instructions    Morning Afternoon Evening Bedtime    clindamycin 300 MG Caps   Last time this was given:  300 mg on 6/27/2017 12:00 AM   Commonly known as:  CLEOCIN        Take 1  Cap by mouth 3 times a day for 10 days.   Dose:  300 mg                        ondansetron 4 MG Tbdp   Last time this was given:  4 mg on 6/27/2017 12:00 AM   Commonly known as:  ZOFRAN ODT        Take 1 Tab by mouth every 8 hours as needed for Nausea/Vomiting.   Dose:  4 mg                          ASK your doctor about these medications        Instructions    Morning Afternoon Evening Bedtime    Melatonin 10 MG Subl        Place  under tongue.                        PRISTIQ 100 MG Tb24   Generic drug:  Desvenlafaxine Succinate        Take 100 mg by mouth every day.   Dose:  100 mg                        sulfamethoxazole-trimethoprim 400-80 MG Tabs   Commonly known as:  BACTRIM        Take 1 Tab by mouth 2 times a day.   Dose:  1 Tab                        tamoxifen 20 MG tablet   Commonly known as:  NOLVADEX        Take 20 mg by mouth every day.   Dose:  20 mg                             Where to Get Your Medications      You can get these medications from any pharmacy     Bring a paper prescription for each of these medications    - clindamycin 300 MG Caps  - ondansetron 4 MG Tbdp              Discharge Instructions       Mastitis  Mastitis is inflammation of the breast tissue. It occurs most often in women who are breastfeeding, but it can also affect other women, and even sometimes men.  CAUSES   Mastitis is usually caused by a bacterial infection. Bacteria enter the breast tissue through cuts or openings in the skin. Typically, this occurs with breastfeeding because of cracked or irritated skin. Sometimes, it can occur even when there is no opening in the skin. It can be associated with plugged milk (lactiferous) ducts. Nipple piercing can also lead to mastitis. Also, some forms of breast cancer can cause mastitis.  SIGNS AND SYMPTOMS   · Swelling, redness, tenderness, and pain in an area of the breast.  · Swelling of the glands under the arm on the same side.  · Fever.  If an infection is allowed to progress,  a collection of pus (abscess) may develop.  DIAGNOSIS   Your health care provider can usually diagnose mastitis based on your symptoms and a physical exam. Tests may be done to help confirm the diagnosis. These may include:   · Removal of pus from the breast by applying pressure to the area. This pus can be examined in the lab to determine which bacteria are present. If an abscess has developed, the fluid in the abscess can be removed with a needle. This can also be used to confirm the diagnosis and determine the bacteria present. In most cases, pus will not be present.  · Blood tests to determine if your body is fighting a bacterial infection.  · Mammogram or ultrasound tests to rule out other problems or diseases.  TREATMENT   Antibiotic medicine is used to treat a bacterial infection. Your health care provider will determine which bacteria are most likely causing the infection and will select an appropriate antibiotic. This is sometimes changed based on the results of tests performed to identify the bacteria, or if there is no response to the antibiotic selected. Antibiotics are usually given by mouth. You may also be given medicine for pain.  Mastitis that occurs with breastfeeding will sometimes go away on its own, so your health care provider may choose to wait 24 hours after first seeing you to decide whether a prescription medicine is needed.  HOME CARE INSTRUCTIONS   · Only take over-the-counter or prescription medicines for pain, fever, or discomfort as directed by your health care provider.  · If your health care provider prescribed an antibiotic, take the medicine as directed. Make sure you finish it even if you start to feel better.  · Do not wear a tight or underwire bra. Wear a soft, supportive bra.  · Increase your fluid intake, especially if you have a fever.  · Women who are breastfeeding should follow these instructions:  ¨ Continue to empty the breast. Your health care provider can tell you  whether this milk is safe for your infant or needs to be thrown out. You may be told to stop nursing until your health care provider thinks it is safe for your baby. Use a breast pump if you are advised to stop nursing.  ¨ Keep your nipples clean and dry.  ¨ Empty the first breast completely before going to the other breast. If your baby is not emptying your breasts completely for some reason, use a breast pump to empty your breasts.  ¨ If you go back to work, pump your breasts while at work to stay in time with your nursing schedule.  ¨ Avoid allowing your breasts to become overly filled with milk (engorged).  SEEK MEDICAL CARE IF:   · You have pus-like discharge from the breast.  · Your symptoms do not improve with the treatment prescribed by your health care provider within 2 days.  SEEK IMMEDIATE MEDICAL CARE IF:   · Your pain and swelling are getting worse.  · You have pain that is not controlled with medicine.  · You have a red line extending from the breast toward your armpit.  · You have a fever or persistent symptoms for more than 2-3 days.  · You have a fever and your symptoms suddenly get worse.     This information is not intended to replace advice given to you by your health care provider. Make sure you discuss any questions you have with your health care provider.     Document Released: 12/18/2006 Document Revised: 12/23/2014 Document Reviewed: 07/18/2014  Elsevier Interactive Patient Education ©2016 [employer]            Patient Information     Patient Information    Following emergency treatment: all patient requiring follow-up care must return either to a private physician or a clinic if your condition worsens before you are able to obtain further medical attention, please return to the emergency room.     Billing Information    At [facility], we work to make the billing process streamlined for our patients.  Our Representatives are here to answer any questions you may have regarding your  hospital bill.  If you have insurance coverage and have supplied your insurance information to us, we will submit a claim to your insurer on your behalf.  Should you have any questions regarding your bill, we can be reached online or by phone as follows:  Online: You are able pay your bills online or live chat with our representatives about any billing questions you may have. We are here to help Monday - Friday from 8:00am to 7:30pm and 9:00am - 12:00pm on Saturdays.  Please visit [email or web address]  for more information.   Phone:  [phone] or [phone]    Please note that your emergency physician, surgeon, pathologist, radiologist, anesthesiologist, and other specialists are not employed by [facility] and will therefore bill separately for their services.  Please contact them directly for any questions concerning their bills at the numbers below:     Emergency Physician Services:  [phone]  [facility]:  [phone]  [facility]:  [phone]  [facility]:  [phone]    1. Your final bill may vary from the amount quoted upon discharge if all procedures are not complete at that time, or if your doctor has additional procedures of which we are not aware. You will receive an additional bill if you return to the Emergency Department at [facility] for suture removal regardless of the facility of which the sutures were placed.     2. Please arrange for settlement of this account at the emergency registration.    3. All self-pay accounts are due in full at the time of treatment.  If you are unable to meet this obligation then payment is expected within 4-5 days.     4. If you have had radiology studies (CT, X-ray, Ultrasound, MRI), you have received a preliminary result during your emergency department visit. Please contact the radiology department [phone] to receive a copy of your final result. Please discuss the  Final result with your primary physician or with the follow up physician provided.     Crisis Hotline:  [place] Crisis Hotline:  2-435-ADCAEDR or 1-967.288.3874  [place] Crisis Hotline:    1-676.432.9013 or 827-640-5255         ED Discharge Follow Up Questions    1. In order to provide you with very good care, we would like to follow up with a phone call in the next few days.  May we have your permission to contact you?     YES /  NO    2. What is the best phone number to call you? (       )_____-__________    3. What is the best time to call you?      Morning  /  Afternoon  /  Evening                   Patient Signature:  ____________________________________________________________    Date:  ____________________________________________________________

## 2017-06-27 PROCEDURE — 700111 HCHG RX REV CODE 636 W/ 250 OVERRIDE (IP): Performed by: EMERGENCY MEDICINE

## 2017-06-27 PROCEDURE — A9270 NON-COVERED ITEM OR SERVICE: HCPCS | Performed by: EMERGENCY MEDICINE

## 2017-06-27 PROCEDURE — 700102 HCHG RX REV CODE 250 W/ 637 OVERRIDE(OP): Performed by: EMERGENCY MEDICINE

## 2017-06-27 RX ADMIN — ONDANSETRON 4 MG: 4 TABLET, ORALLY DISINTEGRATING ORAL at 00:00

## 2017-06-27 RX ADMIN — CLINDAMYCIN HYDROCHLORIDE 300 MG: 150 CAPSULE ORAL at 00:00

## 2017-06-27 NOTE — ED NOTES
Pt D/C to home. D/C instructions and prescriptions given to patient. Pt to follow up with surgeon and PCP. Pt verbalizes understanding. Pt leaves ED with no acute changes, complaints or concerns.

## 2017-06-27 NOTE — ED NOTES
"Chief Complaint   Patient presents with   • Breast Pain     Left side, had breast reconstruction 2/16. Finished radiation May 2016. Pt has had re-occuring infection on left breast since January. Currently on \"I think Bactrim\".    • N/V     Started today afternoon      Redness and edema to left breast. No drainage noted. Low grade fever per pt report at home. Sees Dr. Almeida.     /107 mmHg  Pulse 98  Temp(Src) 37.4 °C (99.3 °F)  Resp 16  Ht 1.651 m (5' 5\")  Wt 70.4 kg (155 lb 3.3 oz)  BMI 25.83 kg/m2  SpO2 96%  LMP 01/18/2016    "

## 2017-06-27 NOTE — ED NOTES
Pt to ED c/o pain and redness to L breast since this AM.  Pt has had cellulitis in this breast before, and started taking antibiotics yesterday when she noticed the sx to start.

## 2017-06-27 NOTE — DISCHARGE INSTRUCTIONS
Mastitis  Mastitis is inflammation of the breast tissue. It occurs most often in women who are breastfeeding, but it can also affect other women, and even sometimes men.  CAUSES   Mastitis is usually caused by a bacterial infection. Bacteria enter the breast tissue through cuts or openings in the skin. Typically, this occurs with breastfeeding because of cracked or irritated skin. Sometimes, it can occur even when there is no opening in the skin. It can be associated with plugged milk (lactiferous) ducts. Nipple piercing can also lead to mastitis. Also, some forms of breast cancer can cause mastitis.  SIGNS AND SYMPTOMS   · Swelling, redness, tenderness, and pain in an area of the breast.  · Swelling of the glands under the arm on the same side.  · Fever.  If an infection is allowed to progress, a collection of pus (abscess) may develop.  DIAGNOSIS   Your health care provider can usually diagnose mastitis based on your symptoms and a physical exam. Tests may be done to help confirm the diagnosis. These may include:   · Removal of pus from the breast by applying pressure to the area. This pus can be examined in the lab to determine which bacteria are present. If an abscess has developed, the fluid in the abscess can be removed with a needle. This can also be used to confirm the diagnosis and determine the bacteria present. In most cases, pus will not be present.  · Blood tests to determine if your body is fighting a bacterial infection.  · Mammogram or ultrasound tests to rule out other problems or diseases.  TREATMENT   Antibiotic medicine is used to treat a bacterial infection. Your health care provider will determine which bacteria are most likely causing the infection and will select an appropriate antibiotic. This is sometimes changed based on the results of tests performed to identify the bacteria, or if there is no response to the antibiotic selected. Antibiotics are usually given by mouth. You may also be  given medicine for pain.  Mastitis that occurs with breastfeeding will sometimes go away on its own, so your health care provider may choose to wait 24 hours after first seeing you to decide whether a prescription medicine is needed.  HOME CARE INSTRUCTIONS   · Only take over-the-counter or prescription medicines for pain, fever, or discomfort as directed by your health care provider.  · If your health care provider prescribed an antibiotic, take the medicine as directed. Make sure you finish it even if you start to feel better.  · Do not wear a tight or underwire bra. Wear a soft, supportive bra.  · Increase your fluid intake, especially if you have a fever.  · Women who are breastfeeding should follow these instructions:  ¨ Continue to empty the breast. Your health care provider can tell you whether this milk is safe for your infant or needs to be thrown out. You may be told to stop nursing until your health care provider thinks it is safe for your baby. Use a breast pump if you are advised to stop nursing.  ¨ Keep your nipples clean and dry.  ¨ Empty the first breast completely before going to the other breast. If your baby is not emptying your breasts completely for some reason, use a breast pump to empty your breasts.  ¨ If you go back to work, pump your breasts while at work to stay in time with your nursing schedule.  ¨ Avoid allowing your breasts to become overly filled with milk (engorged).  SEEK MEDICAL CARE IF:   · You have pus-like discharge from the breast.  · Your symptoms do not improve with the treatment prescribed by your health care provider within 2 days.  SEEK IMMEDIATE MEDICAL CARE IF:   · Your pain and swelling are getting worse.  · You have pain that is not controlled with medicine.  · You have a red line extending from the breast toward your armpit.  · You have a fever or persistent symptoms for more than 2-3 days.  · You have a fever and your symptoms suddenly get worse.     This information  is not intended to replace advice given to you by your health care provider. Make sure you discuss any questions you have with your health care provider.     Document Released: 12/18/2006 Document Revised: 12/23/2014 Document Reviewed: 07/18/2014  Elsevier Interactive Patient Education ©2016 [employer] Inc[employer]

## 2017-06-27 NOTE — ED PROVIDER NOTES
"ED Provider Note    ED Provider Note    Scribed for Sheila Sanchez MD by Sheila Sanchez. 6/26/2017, 11:40 PM.    Primary care provider: Ada Acharya M.D.  Means of arrival: Private  History obtained from: Patient  History limited by: None    CHIEF COMPLAINT  Chief Complaint   Patient presents with   • Breast Pain     Left side, had breast reconstruction 2/16. Finished radiation May 2016. Pt has had re-occuring infection on left breast since January. Currently on \"I think Bactrim\".    • N/V     Started today afternoon        HPI  Yina Cortés is a 51 y.o. female who presents to the Emergency Department for evaluation of left breast redness and swelling. She has history of bilateral mastectomy for breast cancer done in September 2015, she sees Dr. Almeida and Dr. Simmons for this. Patient relates starting today at approximately 8:30 she began feeling nauseous, describes multiple episodes of dry heaves, she noticed at that time breast redness, swelling, localized primarily to the median and inferior aspect of her left breast. No history of trauma, no drainage. Patient relates borderline elevated temperature of 99.4. She sadly has had multiple similar episodes and then through many courses of antibiotics as January. She relates she will be following up with Dr. Almeida further with regard to her frequent infections of this breast. No right-sided symptoms, no other ill contacts, no cough, no dysuria, no other skin changes noted beyond the left breast    REVIEW OF SYSTEMS  Pertinent positives include nausea, fatigue, left breast redness. Pertinent negatives include no drainage, no temperature above 100.4, no trauma, no right-sided symptoms, no abdominal pain.  All other systems reviewed and negative.    PAST MEDICAL HISTORY   has a past medical history of Anesthesia; Cancer ([facility]) (08/2015); Psychiatric problem; and Cellulitis.    SURGICAL HISTORY   has past surgical history that includes breast biopsy; " "mastectomy (Bilateral, 09/15/15); other; tissue expander place/remove (Bilateral, 2/25/2016); breast implant revision (Bilateral, 2/25/2016); breast reconstruction (Bilateral, 2/25/2016); and cath removal (Right, 2/25/2016).    SOCIAL HISTORY  Social History   Substance Use Topics   • Smoking status: Never Smoker    • Smokeless tobacco: Never Used   • Alcohol Use: No      History   Drug Use No       FAMILY HISTORY  History reviewed. No pertinent family history.    CURRENT MEDICATIONS  Home Medications     Reviewed by Sharyn Davison R.N. (Registered Nurse) on 06/26/17 at 2324  Med List Status: Complete    Medication Last Dose Status    Desvenlafaxine Succinate (PRISTIQ) 100 MG TABLET SR 24 HR 6/26/2017 Active    Melatonin 10 MG SL Tab 6/25/2017 Active    sulfamethoxazole-trimethoprim (BACTRIM) 400-80 MG Tab 6/26/2017 Active    tamoxifen (NOLVADEX) 20 MG tablet 6/26/2017 Active                ALLERGIES  Allergies   Allergen Reactions   • Keflex Vomiting       PHYSICAL EXAM  VITAL SIGNS: /107 mmHg  Pulse 98  Temp(Src) 37.4 °C (99.3 °F)  Resp 16  Ht 1.651 m (5' 5\")  Wt 70.4 kg (155 lb 3.3 oz)  BMI 25.83 kg/m2  SpO2 96%  LMP 01/18/2016    General: Alert, no acute distress  Skin: Warm, dry, normal for ethnicity. Moderate induration and impressive erythema to inferior and medial aspect of the left breast approximating the 6:00 and 9:00 position of the breast. No drainage, no fluctuance, right breast is unremarkable.  Head: Normocephalic, atraumatic  Neck: Trachea midline, no tenderness  Eye: PERRL, normal conjunctiva  ENMT: Oral mucosa moist, no pharyngeal erythema or exudate  Cardiovascular: Regular rate and rhythm, No murmur, Normal peripheral perfusion  Respiratory: Lungs CTA, respirations are non-labored, breath sounds are equal  Musculoskeletal: No swelling, no deformity  Neurological: Alert and oriented to person, place, time, and situation  Lymphatics: No lymphadenopathy  Psychiatric: Cooperative, " "appropriate mood & affect        COURSE & MEDICAL DECISION MAKING  Pertinent Labs & Imaging studies reviewed. (See chart for details)    11:40 PM - Patient seen and examined at bedside. Patient will be treated with Rocephin and zofran. OThe differential diagnoses include but are not limited to: Left breast cellulitis, mastitis     Patient Vitals for the past 24 hrs:   BP Temp Pulse Resp SpO2 Height Weight   06/26/17 2112 (!) 182/107 mmHg 37.4 °C (99.3 °F) 98 16 96 % - -   06/26/17 2111 - - - - - 1.651 m (5' 5\") 70.4 kg (155 lb 3.3 oz)       Decision Making:  This is a 51 y.o. year old female who presents with atraumatic left breast discomfort, redness, swelling, as well as nausea and vomiting. Patient is afebrile, no tachycardia, no hypotension, no history or exam evidence consistent with septicemia. She does have nausea and body aches aside do feel parenteral antibiotics are warranted. We'll treat with IM Rocephin, Zofran for nausea. No indication for emergent surgical intervention and there is no exam evidence consistent with breast abscess. Patient has established surgeons with whom she will follow up. Will write for appropriate outpatient antibiotic.    The patient will return for new or worsening symptoms and is stable at the time of discharge.    Patient has had high blood pressure while in the emergency department, felt likely secondary to medical condition. Counseled patient to monitor blood pressure at home and follow up with primary care physician.    DISPOSITION:  Patient will be discharged home in stable condition.    FOLLOW UP:  Ada Acharya M.D.  [address]  [address]  [facility] [address]  [phone]    Schedule an appointment as soon as possible for a visit in 2 days        OUTPATIENT MEDICATIONS:  Discharge Medication List as of 6/27/2017 12:07 AM      START taking these medications    Details   clindamycin (CLEOCIN) 300 MG Cap Take 1 Cap by mouth 3 times a day for 10 days., Disp-30 Cap, " R-0, Print Rx Paper      ondansetron (ZOFRAN ODT) 4 MG TABLET DISPERSIBLE Take 1 Tab by mouth every 8 hours as needed for Nausea/Vomiting., Disp-10 Tab, R-0, Print Rx Paper                 FINAL IMPRESSION  1. Cellulitis of left breast          Sheila RUSSO (Tricia), am scribing for, and in the presence of, Sheila Sanchez MD.    Electronically signed by: Sheila Sanchez ([place]), 6/26/2017    ISheila MD personally performed the services described in this documentation, as scribed by Sheila Sanchez in my presence, and it is both accurate and complete    The note accurately reflects work and decisions made by me.  Sheila Sanchez  6/27/2017  3:42 AM

## 2017-06-28 ENCOUNTER — PATIENT OUTREACH (OUTPATIENT)
Dept: HEALTH INFORMATION MANAGEMENT | Facility: OTHER | Age: 52
End: 2017-06-28

## 2017-07-03 ENCOUNTER — HOSPITAL ENCOUNTER (OUTPATIENT)
Dept: LAB | Facility: MEDICAL CENTER | Age: 52
End: 2017-07-03
Attending: SPECIALIST
Payer: COMMERCIAL

## 2017-07-03 LAB
ALBUMIN SERPL BCP-MCNC: 3.5 G/DL (ref 3.2–4.9)
ALBUMIN/GLOB SERPL: 1 G/DL
ALP SERPL-CCNC: 47 U/L (ref 30–99)
ALT SERPL-CCNC: 24 U/L (ref 2–50)
ANION GAP SERPL CALC-SCNC: 6 MMOL/L (ref 0–11.9)
AST SERPL-CCNC: 25 U/L (ref 12–45)
BASOPHILS # BLD AUTO: 0.6 % (ref 0–1.8)
BASOPHILS # BLD: 0.03 K/UL (ref 0–0.12)
BILIRUB SERPL-MCNC: 0.3 MG/DL (ref 0.1–1.5)
BUN SERPL-MCNC: 24 MG/DL (ref 8–22)
CALCIUM SERPL-MCNC: 9 MG/DL (ref 8.5–10.5)
CHLORIDE SERPL-SCNC: 105 MMOL/L (ref 96–112)
CO2 SERPL-SCNC: 28 MMOL/L (ref 20–33)
CREAT SERPL-MCNC: 0.84 MG/DL (ref 0.5–1.4)
EOSINOPHIL # BLD AUTO: 0.05 K/UL (ref 0–0.51)
EOSINOPHIL NFR BLD: 1 % (ref 0–6.9)
ERYTHROCYTE [DISTWIDTH] IN BLOOD BY AUTOMATED COUNT: 46.5 FL (ref 35.9–50)
GFR SERPL CREATININE-BSD FRML MDRD: >60 ML/MIN/1.73 M 2
GLOBULIN SER CALC-MCNC: 3.4 G/DL (ref 1.9–3.5)
GLUCOSE SERPL-MCNC: 126 MG/DL (ref 65–99)
HCT VFR BLD AUTO: 40.7 % (ref 37–47)
HGB BLD-MCNC: 12.7 G/DL (ref 12–16)
IMM GRANULOCYTES # BLD AUTO: 0.01 K/UL (ref 0–0.11)
IMM GRANULOCYTES NFR BLD AUTO: 0.2 % (ref 0–0.9)
LYMPHOCYTES # BLD AUTO: 1.67 K/UL (ref 1–4.8)
LYMPHOCYTES NFR BLD: 32.6 % (ref 22–41)
MCH RBC QN AUTO: 27.2 PG (ref 27–33)
MCHC RBC AUTO-ENTMCNC: 31.2 G/DL (ref 33.6–35)
MCV RBC AUTO: 87.2 FL (ref 81.4–97.8)
MONOCYTES # BLD AUTO: 0.27 K/UL (ref 0–0.85)
MONOCYTES NFR BLD AUTO: 5.3 % (ref 0–13.4)
NEUTROPHILS # BLD AUTO: 3.1 K/UL (ref 2–7.15)
NEUTROPHILS NFR BLD: 60.3 % (ref 44–72)
NRBC # BLD AUTO: 0 K/UL
NRBC BLD AUTO-RTO: 0 /100 WBC
PLATELET # BLD AUTO: 239 K/UL (ref 164–446)
PMV BLD AUTO: 10.1 FL (ref 9–12.9)
POTASSIUM SERPL-SCNC: 4.3 MMOL/L (ref 3.6–5.5)
PROT SERPL-MCNC: 6.9 G/DL (ref 6–8.2)
RBC # BLD AUTO: 4.67 M/UL (ref 4.2–5.4)
SODIUM SERPL-SCNC: 139 MMOL/L (ref 135–145)
WBC # BLD AUTO: 5.1 K/UL (ref 4.8–10.8)

## 2017-07-03 PROCEDURE — 80053 COMPREHEN METABOLIC PANEL: CPT

## 2017-07-03 PROCEDURE — 36415 COLL VENOUS BLD VENIPUNCTURE: CPT

## 2017-07-03 PROCEDURE — 85025 COMPLETE CBC W/AUTO DIFF WBC: CPT

## 2017-07-24 ENCOUNTER — HOSPITAL ENCOUNTER (OUTPATIENT)
Dept: LAB | Facility: MEDICAL CENTER | Age: 52
End: 2017-07-24
Attending: NURSE PRACTITIONER
Payer: COMMERCIAL

## 2017-07-24 LAB
25(OH)D3 SERPL-MCNC: 36 NG/ML (ref 30–100)
ALBUMIN SERPL BCP-MCNC: 3.8 G/DL (ref 3.2–4.9)
ALBUMIN/GLOB SERPL: 1.3 G/DL
ALP SERPL-CCNC: 55 U/L (ref 30–99)
ALT SERPL-CCNC: 25 U/L (ref 2–50)
ANION GAP SERPL CALC-SCNC: 5 MMOL/L (ref 0–11.9)
AST SERPL-CCNC: 24 U/L (ref 12–45)
BILIRUB SERPL-MCNC: 0.4 MG/DL (ref 0.1–1.5)
BUN SERPL-MCNC: 20 MG/DL (ref 8–22)
CALCIUM SERPL-MCNC: 9.6 MG/DL (ref 8.5–10.5)
CHLORIDE SERPL-SCNC: 107 MMOL/L (ref 96–112)
CHOLEST SERPL-MCNC: 169 MG/DL (ref 100–199)
CO2 SERPL-SCNC: 27 MMOL/L (ref 20–33)
CREAT SERPL-MCNC: 0.82 MG/DL (ref 0.5–1.4)
GFR SERPL CREATININE-BSD FRML MDRD: >60 ML/MIN/1.73 M 2
GLOBULIN SER CALC-MCNC: 2.9 G/DL (ref 1.9–3.5)
GLUCOSE SERPL-MCNC: 85 MG/DL (ref 65–99)
HDLC SERPL-MCNC: 62 MG/DL
LDLC SERPL CALC-MCNC: 84 MG/DL
POTASSIUM SERPL-SCNC: 4.6 MMOL/L (ref 3.6–5.5)
PROT SERPL-MCNC: 6.7 G/DL (ref 6–8.2)
SODIUM SERPL-SCNC: 139 MMOL/L (ref 135–145)
TRIGL SERPL-MCNC: 117 MG/DL (ref 0–149)
TSH SERPL DL<=0.005 MIU/L-ACNC: 2.33 UIU/ML (ref 0.3–3.7)

## 2017-07-24 PROCEDURE — 36415 COLL VENOUS BLD VENIPUNCTURE: CPT

## 2017-07-24 PROCEDURE — 80053 COMPREHEN METABOLIC PANEL: CPT

## 2017-07-24 PROCEDURE — 82306 VITAMIN D 25 HYDROXY: CPT

## 2017-07-24 PROCEDURE — 84443 ASSAY THYROID STIM HORMONE: CPT

## 2017-07-24 PROCEDURE — 80061 LIPID PANEL: CPT

## 2017-11-10 ENCOUNTER — HOSPITAL ENCOUNTER (OUTPATIENT)
Dept: LAB | Facility: MEDICAL CENTER | Age: 52
End: 2017-11-10
Attending: SPECIALIST
Payer: COMMERCIAL

## 2017-11-10 LAB
ALBUMIN SERPL BCP-MCNC: 3.6 G/DL (ref 3.2–4.9)
ALBUMIN/GLOB SERPL: 1.3 G/DL
ALP SERPL-CCNC: 52 U/L (ref 30–99)
ALT SERPL-CCNC: 26 U/L (ref 2–50)
ANION GAP SERPL CALC-SCNC: 6 MMOL/L (ref 0–11.9)
AST SERPL-CCNC: 34 U/L (ref 12–45)
BASOPHILS # BLD AUTO: 1 % (ref 0–1.8)
BASOPHILS # BLD: 0.06 K/UL (ref 0–0.12)
BILIRUB SERPL-MCNC: 0.3 MG/DL (ref 0.1–1.5)
BUN SERPL-MCNC: 22 MG/DL (ref 8–22)
CALCIUM SERPL-MCNC: 8.9 MG/DL (ref 8.5–10.5)
CHLORIDE SERPL-SCNC: 109 MMOL/L (ref 96–112)
CO2 SERPL-SCNC: 26 MMOL/L (ref 20–33)
CREAT SERPL-MCNC: 0.63 MG/DL (ref 0.5–1.4)
EOSINOPHIL # BLD AUTO: 0.07 K/UL (ref 0–0.51)
EOSINOPHIL NFR BLD: 1.1 % (ref 0–6.9)
ERYTHROCYTE [DISTWIDTH] IN BLOOD BY AUTOMATED COUNT: 43.3 FL (ref 35.9–50)
GFR SERPL CREATININE-BSD FRML MDRD: >60 ML/MIN/1.73 M 2
GLOBULIN SER CALC-MCNC: 2.7 G/DL (ref 1.9–3.5)
GLUCOSE SERPL-MCNC: 91 MG/DL (ref 65–99)
HCT VFR BLD AUTO: 42.8 % (ref 37–47)
HGB BLD-MCNC: 13.8 G/DL (ref 12–16)
IMM GRANULOCYTES # BLD AUTO: 0.03 K/UL (ref 0–0.11)
IMM GRANULOCYTES NFR BLD AUTO: 0.5 % (ref 0–0.9)
LYMPHOCYTES # BLD AUTO: 1.98 K/UL (ref 1–4.8)
LYMPHOCYTES NFR BLD: 31.8 % (ref 22–41)
MCH RBC QN AUTO: 28.6 PG (ref 27–33)
MCHC RBC AUTO-ENTMCNC: 32.2 G/DL (ref 33.6–35)
MCV RBC AUTO: 88.6 FL (ref 81.4–97.8)
MONOCYTES # BLD AUTO: 0.39 K/UL (ref 0–0.85)
MONOCYTES NFR BLD AUTO: 6.3 % (ref 0–13.4)
NEUTROPHILS # BLD AUTO: 3.69 K/UL (ref 2–7.15)
NEUTROPHILS NFR BLD: 59.3 % (ref 44–72)
NRBC # BLD AUTO: 0 K/UL
NRBC BLD AUTO-RTO: 0 /100 WBC
PLATELET # BLD AUTO: 195 K/UL (ref 164–446)
PMV BLD AUTO: 11 FL (ref 9–12.9)
POTASSIUM SERPL-SCNC: 4.3 MMOL/L (ref 3.6–5.5)
PROT SERPL-MCNC: 6.3 G/DL (ref 6–8.2)
RBC # BLD AUTO: 4.83 M/UL (ref 4.2–5.4)
SODIUM SERPL-SCNC: 141 MMOL/L (ref 135–145)
WBC # BLD AUTO: 6.2 K/UL (ref 4.8–10.8)

## 2017-11-10 PROCEDURE — 36415 COLL VENOUS BLD VENIPUNCTURE: CPT

## 2017-11-10 PROCEDURE — 85025 COMPLETE CBC W/AUTO DIFF WBC: CPT

## 2017-11-10 PROCEDURE — 80053 COMPREHEN METABOLIC PANEL: CPT

## 2018-03-21 ENCOUNTER — HOSPITAL ENCOUNTER (OUTPATIENT)
Dept: LAB | Facility: MEDICAL CENTER | Age: 53
End: 2018-03-21
Attending: SPECIALIST
Payer: COMMERCIAL

## 2018-03-21 LAB
ALBUMIN SERPL BCP-MCNC: 3.8 G/DL (ref 3.2–4.9)
ALBUMIN/GLOB SERPL: 1.3 G/DL
ALP SERPL-CCNC: 54 U/L (ref 30–99)
ALT SERPL-CCNC: 25 U/L (ref 2–50)
ANION GAP SERPL CALC-SCNC: 4 MMOL/L (ref 0–11.9)
AST SERPL-CCNC: 29 U/L (ref 12–45)
BASOPHILS # BLD AUTO: 0.6 % (ref 0–1.8)
BASOPHILS # BLD: 0.04 K/UL (ref 0–0.12)
BILIRUB SERPL-MCNC: 0.4 MG/DL (ref 0.1–1.5)
BUN SERPL-MCNC: 19 MG/DL (ref 8–22)
CALCIUM SERPL-MCNC: 8.9 MG/DL (ref 8.5–10.5)
CHLORIDE SERPL-SCNC: 108 MMOL/L (ref 96–112)
CO2 SERPL-SCNC: 29 MMOL/L (ref 20–33)
CREAT SERPL-MCNC: 0.8 MG/DL (ref 0.5–1.4)
EOSINOPHIL # BLD AUTO: 0.1 K/UL (ref 0–0.51)
EOSINOPHIL NFR BLD: 1.6 % (ref 0–6.9)
ERYTHROCYTE [DISTWIDTH] IN BLOOD BY AUTOMATED COUNT: 45.2 FL (ref 35.9–50)
GLOBULIN SER CALC-MCNC: 3 G/DL (ref 1.9–3.5)
GLUCOSE SERPL-MCNC: 80 MG/DL (ref 65–99)
HCT VFR BLD AUTO: 44.5 % (ref 37–47)
HGB BLD-MCNC: 14.2 G/DL (ref 12–16)
IMM GRANULOCYTES # BLD AUTO: 0.01 K/UL (ref 0–0.11)
IMM GRANULOCYTES NFR BLD AUTO: 0.2 % (ref 0–0.9)
LYMPHOCYTES # BLD AUTO: 1.42 K/UL (ref 1–4.8)
LYMPHOCYTES NFR BLD: 22 % (ref 22–41)
MCH RBC QN AUTO: 29 PG (ref 27–33)
MCHC RBC AUTO-ENTMCNC: 31.9 G/DL (ref 33.6–35)
MCV RBC AUTO: 90.8 FL (ref 81.4–97.8)
MONOCYTES # BLD AUTO: 0.47 K/UL (ref 0–0.85)
MONOCYTES NFR BLD AUTO: 7.3 % (ref 0–13.4)
NEUTROPHILS # BLD AUTO: 4.41 K/UL (ref 2–7.15)
NEUTROPHILS NFR BLD: 68.3 % (ref 44–72)
NRBC # BLD AUTO: 0 K/UL
NRBC BLD-RTO: 0 /100 WBC
PLATELET # BLD AUTO: 240 K/UL (ref 164–446)
PMV BLD AUTO: 10.5 FL (ref 9–12.9)
POTASSIUM SERPL-SCNC: 4.8 MMOL/L (ref 3.6–5.5)
PROT SERPL-MCNC: 6.8 G/DL (ref 6–8.2)
RBC # BLD AUTO: 4.9 M/UL (ref 4.2–5.4)
SODIUM SERPL-SCNC: 141 MMOL/L (ref 135–145)
WBC # BLD AUTO: 6.5 K/UL (ref 4.8–10.8)

## 2018-03-21 PROCEDURE — 36415 COLL VENOUS BLD VENIPUNCTURE: CPT

## 2018-03-21 PROCEDURE — 85025 COMPLETE CBC W/AUTO DIFF WBC: CPT

## 2018-03-21 PROCEDURE — 80053 COMPREHEN METABOLIC PANEL: CPT

## 2018-05-09 ENCOUNTER — HOSPITAL ENCOUNTER (OUTPATIENT)
Dept: RADIOLOGY | Facility: MEDICAL CENTER | Age: 53
End: 2018-05-09
Attending: SPECIALIST
Payer: COMMERCIAL

## 2018-05-09 DIAGNOSIS — C50.919 MALIGNANT NEOPLASM OF FEMALE BREAST, UNSPECIFIED ESTROGEN RECEPTOR STATUS, UNSPECIFIED LATERALITY, UNSPECIFIED SITE OF BREAST (HCC): ICD-10-CM

## 2018-05-09 PROCEDURE — 700117 HCHG RX CONTRAST REV CODE 255: Performed by: SPECIALIST

## 2018-05-09 PROCEDURE — 71260 CT THORAX DX C+: CPT

## 2018-05-09 RX ADMIN — IOHEXOL 100 ML: 350 INJECTION, SOLUTION INTRAVENOUS at 10:03

## 2018-05-09 RX ADMIN — IOHEXOL 50 ML: 240 INJECTION, SOLUTION INTRATHECAL; INTRAVASCULAR; INTRAVENOUS; ORAL at 10:04

## 2018-06-18 ENCOUNTER — HOSPITAL ENCOUNTER (OUTPATIENT)
Dept: RADIOLOGY | Facility: MEDICAL CENTER | Age: 53
End: 2018-06-18
Attending: SPECIALIST
Payer: COMMERCIAL

## 2018-06-18 DIAGNOSIS — C50.919 MALIGNANT NEOPLASM OF FEMALE BREAST, UNSPECIFIED ESTROGEN RECEPTOR STATUS, UNSPECIFIED LATERALITY, UNSPECIFIED SITE OF BREAST (HCC): ICD-10-CM

## 2018-06-18 PROCEDURE — 70553 MRI BRAIN STEM W/O & W/DYE: CPT

## 2018-06-18 PROCEDURE — A9579 GAD-BASE MR CONTRAST NOS,1ML: HCPCS | Performed by: SPECIALIST

## 2018-06-18 PROCEDURE — 700117 HCHG RX CONTRAST REV CODE 255: Performed by: SPECIALIST

## 2018-06-18 RX ADMIN — GADODIAMIDE 15 ML: 287 INJECTION INTRAVENOUS at 09:01

## 2018-07-13 ENCOUNTER — TELEPHONE (OUTPATIENT)
Dept: HEMATOLOGY ONCOLOGY | Facility: MEDICAL CENTER | Age: 53
End: 2018-07-13

## 2018-07-13 NOTE — TELEPHONE ENCOUNTER
1st attempt to contact the patient- Left voicemail for patient requesting a return call to schedule New Oncology Genetic appointment. NP/ [facility]/ Breast Cancer/ Harsh Munoz

## 2018-07-19 ENCOUNTER — OFFICE VISIT (OUTPATIENT)
Dept: HEMATOLOGY ONCOLOGY | Facility: MEDICAL CENTER | Age: 53
End: 2018-07-19
Payer: COMMERCIAL

## 2018-07-19 ENCOUNTER — NON-PROVIDER VISIT (OUTPATIENT)
Dept: HEMATOLOGY ONCOLOGY | Facility: MEDICAL CENTER | Age: 53
End: 2018-07-19
Payer: COMMERCIAL

## 2018-07-19 VITALS
OXYGEN SATURATION: 99 % | BODY MASS INDEX: 25.66 KG/M2 | HEART RATE: 70 BPM | TEMPERATURE: 98.6 F | WEIGHT: 154 LBS | SYSTOLIC BLOOD PRESSURE: 122 MMHG | RESPIRATION RATE: 16 BRPM | DIASTOLIC BLOOD PRESSURE: 80 MMHG | HEIGHT: 65 IN

## 2018-07-19 DIAGNOSIS — C50.212 BILATERAL MALIGNANT NEOPLASM OF UPPER INNER QUADRANT OF BREAST IN FEMALE, UNSPECIFIED ESTROGEN RECEPTOR STATUS (HCC): ICD-10-CM

## 2018-07-19 DIAGNOSIS — Z80.42 FAMILY HISTORY OF PROSTATE CANCER: ICD-10-CM

## 2018-07-19 DIAGNOSIS — Z15.01 BREAST CANCER GENETIC SUSCEPTIBILITY: ICD-10-CM

## 2018-07-19 DIAGNOSIS — C50.211 BILATERAL MALIGNANT NEOPLASM OF UPPER INNER QUADRANT OF BREAST IN FEMALE, UNSPECIFIED ESTROGEN RECEPTOR STATUS (HCC): ICD-10-CM

## 2018-07-19 PROCEDURE — 99242 OFF/OP CONSLTJ NEW/EST SF 20: CPT | Mod: 25 | Performed by: MEDICAL GENETICS

## 2018-07-19 PROCEDURE — 36415 COLL VENOUS BLD VENIPUNCTURE: CPT | Performed by: MEDICAL GENETICS

## 2018-07-19 RX ORDER — VENLAFAXINE HYDROCHLORIDE 37.5 MG/1
37.5 CAPSULE, EXTENDED RELEASE ORAL DAILY
COMMUNITY

## 2018-07-19 ASSESSMENT — PAIN SCALES - GENERAL: PAINLEVEL: NO PAIN

## 2018-07-19 NOTE — PROGRESS NOTES
Referral and epic data reviewed  Counseling and educational material prepared    20 minutes    [id number]

## 2018-07-19 NOTE — PROGRESS NOTES
Yina Cortés is a 52 y.o. year old patient who was referred for cancer genetic risk assessment     Chief Complaint:   Chief Complaint   Patient presents with   • New Patient      Dx:Breast Cancer       HPI: The patient was well until 3 years ago at which time a suspicious physical exam caused the patient to be referred for imaging . A suspicious mammogram study identified a (bilateral) breast mass. A biopsy ultrasound guided was performed and a specimen was submitted to pathology.     A diagnosis of bilateral  carcinoma was made.   Review of personal and family histories suggested that a cancer genetic risk assessment was in order.    Review of Systems (ROS):  Constitutional normal   Eyes normal   ENT normal    Respiratory normal   Cardiovascular normal   Gastrointestinal normal   Genitourinary normal   Musculoskeletal normal   Skin normal  Neurological  Normal   Endocrine normal   Psychiatric normal   Hematologic/lymphatic normal   Allergic/Immunologic no sensitivities reported  Remaining systems negative? Yes  Total review of symptoms   >10:       History (Past/Family/ROS)  Family History:    3 generation pedigree built  Yes   Jaleesaer-Gerry empiric risk calculation No   [place] empiric risk calculation  No  Social History     Yes    Social History     Social History   • Marital status: Single     Spouse name: N/A   • Number of children: N/A   • Years of education: N/A     Occupational History   • Not on file.     Social History Main Topics   • Smoking status: Never Smoker   • Smokeless tobacco: Never Used   • Alcohol use No   • Drug use: No   • Sexual activity: Not on file     Other Topics Concern   • Not on file     Social History Narrative   • No narrative on file       Patient Past History     Yes  History areas assessed     3:   NCCN Testing Criteria Met                            Yes    Physical Exam  Constitutional    Encounter Vitals  Standard Vitals     Pulmonary-Specific Vitals     Durable  Medical Equipment-Specific Vitals              General appearance: normal   Head Circumference: 22 1/2 inches  ([place])  Eyes    Conjunctiva: clear   Pupils: reactive to light and accomodation      ENMT    External inspection: normal    Assessment of Hearing: normal    Lips/cheeks/gums: no lesions  Neck   External exam: normal    Thyroid: no masses   Respiratory   Auscultation: clear      Cardiovascular   Auscultation: RRR with no murmers   Edema : none  Chest   Breasts (exam): not done   Palpation:   GI   External exam and palpation: normal      Pelvic (female): not done   External exam (male):   Lymphatic   Palpation in 2 areas: normal    Musculoskeletal   Gait: normal   Digits and Nails: no lesions  Skin   Inspection: normal    Palpation: no masses                  Fibrofolliculoma:Yes                  Trichodiscoma:No                   Akrochordon: No                   CAfe-au-lait: No                  Hypopigmentation: No                  Mucosal freckling No  Neurologic   Cranial nerves: intact    DTR’s: 1+ and equal   PE summary   18 bullets (of 25 total):     Problem Points   New, further evaluation planned (4)   High  risk    DATA POINTS    >4:     PROBLEM POINTS  >4:     RISK  Intermediate    TYPE OF MEDICAL DECISION MAKING High      30 minutes TIME (FACE TO FACE) AND DOCUMENTED  DOCUMENTATION DESCRIBE CONTENTS OF COUNSELING/CARE COORDINATION  DOCUMENTATION SUPPORT >50% TIME SPENT IN COUNSELING/COORDINATION       50036 (30 minutes)  with  Moderate complexity    Patient with diagnosis of bilateral breast cancer made at age 49.  Family history of prostate cancer    Color panel    Begin: 9:10  End 9: 45

## 2018-07-20 VITALS
HEIGHT: 65 IN | SYSTOLIC BLOOD PRESSURE: 122 MMHG | RESPIRATION RATE: 16 BRPM | TEMPERATURE: 98.6 F | DIASTOLIC BLOOD PRESSURE: 80 MMHG | BODY MASS INDEX: 25.66 KG/M2 | HEART RATE: 70 BPM | OXYGEN SATURATION: 99 % | WEIGHT: 154 LBS

## 2018-07-20 ASSESSMENT — PAIN SCALES - GENERAL: PAINLEVEL: NO PAIN

## 2018-07-20 NOTE — PROGRESS NOTES
Yina Cortés is a 52 y.o. female here for a non-provider visit for: Lab Draws  on 7/20/2018 at 8:03 AM    Procedure Performed: Venipuncture     Anatomical site: Right Antecubital Area (AC)    Equipment used: 23g Butterfly    Labs drawn: [facility]    Ordering Provider: Dr. Randell Nolan    Alexander By: Acacia Ansari, Jean Ass't   No complications. was present the entire time the patient was having labs drawn.

## 2018-12-07 ENCOUNTER — HOSPITAL ENCOUNTER (OUTPATIENT)
Dept: LAB | Facility: MEDICAL CENTER | Age: 53
End: 2018-12-07
Attending: SPECIALIST
Payer: COMMERCIAL

## 2018-12-07 LAB
ALBUMIN SERPL BCP-MCNC: 4 G/DL (ref 3.2–4.9)
ALBUMIN/GLOB SERPL: 1.5 G/DL
ALP SERPL-CCNC: 52 U/L (ref 30–99)
ALT SERPL-CCNC: 24 U/L (ref 2–50)
ANION GAP SERPL CALC-SCNC: 4 MMOL/L (ref 0–11.9)
AST SERPL-CCNC: 29 U/L (ref 12–45)
BASOPHILS # BLD AUTO: 0.9 % (ref 0–1.8)
BASOPHILS # BLD: 0.05 K/UL (ref 0–0.12)
BILIRUB SERPL-MCNC: 0.4 MG/DL (ref 0.1–1.5)
BUN SERPL-MCNC: 21 MG/DL (ref 8–22)
CALCIUM SERPL-MCNC: 9.7 MG/DL (ref 8.5–10.5)
CHLORIDE SERPL-SCNC: 103 MMOL/L (ref 96–112)
CO2 SERPL-SCNC: 31 MMOL/L (ref 20–33)
CREAT SERPL-MCNC: 0.91 MG/DL (ref 0.5–1.4)
EOSINOPHIL # BLD AUTO: 0.08 K/UL (ref 0–0.51)
EOSINOPHIL NFR BLD: 1.4 % (ref 0–6.9)
ERYTHROCYTE [DISTWIDTH] IN BLOOD BY AUTOMATED COUNT: 42.8 FL (ref 35.9–50)
GLOBULIN SER CALC-MCNC: 2.7 G/DL (ref 1.9–3.5)
GLUCOSE SERPL-MCNC: 87 MG/DL (ref 65–99)
HCT VFR BLD AUTO: 42.8 % (ref 37–47)
HGB BLD-MCNC: 14.2 G/DL (ref 12–16)
IMM GRANULOCYTES # BLD AUTO: 0.02 K/UL (ref 0–0.11)
IMM GRANULOCYTES NFR BLD AUTO: 0.3 % (ref 0–0.9)
LYMPHOCYTES # BLD AUTO: 1.86 K/UL (ref 1–4.8)
LYMPHOCYTES NFR BLD: 31.7 % (ref 22–41)
MCH RBC QN AUTO: 29.3 PG (ref 27–33)
MCHC RBC AUTO-ENTMCNC: 33.2 G/DL (ref 33.6–35)
MCV RBC AUTO: 88.4 FL (ref 81.4–97.8)
MONOCYTES # BLD AUTO: 0.47 K/UL (ref 0–0.85)
MONOCYTES NFR BLD AUTO: 8 % (ref 0–13.4)
NEUTROPHILS # BLD AUTO: 3.39 K/UL (ref 2–7.15)
NEUTROPHILS NFR BLD: 57.7 % (ref 44–72)
NRBC # BLD AUTO: 0 K/UL
NRBC BLD-RTO: 0 /100 WBC
PLATELET # BLD AUTO: 209 K/UL (ref 164–446)
PMV BLD AUTO: 10.7 FL (ref 9–12.9)
POTASSIUM SERPL-SCNC: 4.6 MMOL/L (ref 3.6–5.5)
PROT SERPL-MCNC: 6.7 G/DL (ref 6–8.2)
RBC # BLD AUTO: 4.84 M/UL (ref 4.2–5.4)
SODIUM SERPL-SCNC: 138 MMOL/L (ref 135–145)
WBC # BLD AUTO: 5.9 K/UL (ref 4.8–10.8)

## 2018-12-07 PROCEDURE — 85025 COMPLETE CBC W/AUTO DIFF WBC: CPT

## 2018-12-07 PROCEDURE — 36415 COLL VENOUS BLD VENIPUNCTURE: CPT

## 2018-12-07 PROCEDURE — 80053 COMPREHEN METABOLIC PANEL: CPT

## 2019-03-25 ENCOUNTER — HOSPITAL ENCOUNTER (OUTPATIENT)
Dept: LAB | Facility: MEDICAL CENTER | Age: 54
End: 2019-03-25
Attending: INTERNAL MEDICINE
Payer: COMMERCIAL

## 2019-03-25 LAB
ALBUMIN SERPL BCP-MCNC: 3.7 G/DL (ref 3.2–4.9)
ALBUMIN/GLOB SERPL: 1.6 G/DL
ALP SERPL-CCNC: 59 U/L (ref 30–99)
ALT SERPL-CCNC: 21 U/L (ref 2–50)
ANION GAP SERPL CALC-SCNC: 5 MMOL/L (ref 0–11.9)
AST SERPL-CCNC: 24 U/L (ref 12–45)
BILIRUB SERPL-MCNC: 0.3 MG/DL (ref 0.1–1.5)
BUN SERPL-MCNC: 18 MG/DL (ref 8–22)
CALCIUM SERPL-MCNC: 8.5 MG/DL (ref 8.5–10.5)
CHLORIDE SERPL-SCNC: 107 MMOL/L (ref 96–112)
CO2 SERPL-SCNC: 26 MMOL/L (ref 20–33)
CREAT SERPL-MCNC: 0.59 MG/DL (ref 0.5–1.4)
GLOBULIN SER CALC-MCNC: 2.3 G/DL (ref 1.9–3.5)
GLUCOSE SERPL-MCNC: 95 MG/DL (ref 65–99)
POTASSIUM SERPL-SCNC: 4.1 MMOL/L (ref 3.6–5.5)
PROT SERPL-MCNC: 6 G/DL (ref 6–8.2)
SODIUM SERPL-SCNC: 138 MMOL/L (ref 135–145)

## 2019-03-25 PROCEDURE — 36415 COLL VENOUS BLD VENIPUNCTURE: CPT

## 2019-03-25 PROCEDURE — 80053 COMPREHEN METABOLIC PANEL: CPT

## 2019-05-18 ENCOUNTER — HOSPITAL ENCOUNTER (OUTPATIENT)
Dept: LAB | Facility: MEDICAL CENTER | Age: 54
End: 2019-05-18
Attending: NURSE PRACTITIONER
Payer: COMMERCIAL

## 2019-05-18 LAB
25(OH)D3 SERPL-MCNC: 27 NG/ML (ref 30–100)
ALBUMIN SERPL BCP-MCNC: 3.7 G/DL (ref 3.2–4.9)
ALBUMIN/GLOB SERPL: 1.4 G/DL
ALP SERPL-CCNC: 52 U/L (ref 30–99)
ALT SERPL-CCNC: 26 U/L (ref 2–50)
ANION GAP SERPL CALC-SCNC: 9 MMOL/L (ref 0–11.9)
AST SERPL-CCNC: 28 U/L (ref 12–45)
BASOPHILS # BLD AUTO: 0.7 % (ref 0–1.8)
BASOPHILS # BLD: 0.04 K/UL (ref 0–0.12)
BILIRUB SERPL-MCNC: 0.4 MG/DL (ref 0.1–1.5)
BUN SERPL-MCNC: 14 MG/DL (ref 8–22)
CALCIUM SERPL-MCNC: 8.6 MG/DL (ref 8.5–10.5)
CHLORIDE SERPL-SCNC: 108 MMOL/L (ref 96–112)
CHOLEST SERPL-MCNC: 168 MG/DL (ref 100–199)
CO2 SERPL-SCNC: 24 MMOL/L (ref 20–33)
CREAT SERPL-MCNC: 0.72 MG/DL (ref 0.5–1.4)
EOSINOPHIL # BLD AUTO: 0.08 K/UL (ref 0–0.51)
EOSINOPHIL NFR BLD: 1.4 % (ref 0–6.9)
ERYTHROCYTE [DISTWIDTH] IN BLOOD BY AUTOMATED COUNT: 43.3 FL (ref 35.9–50)
EST. AVERAGE GLUCOSE BLD GHB EST-MCNC: 114 MG/DL
FASTING STATUS PATIENT QL REPORTED: NORMAL
FERRITIN SERPL-MCNC: 134.4 NG/ML (ref 10–291)
FOLATE SERPL-MCNC: >23.6 NG/ML
GLOBULIN SER CALC-MCNC: 2.7 G/DL (ref 1.9–3.5)
GLUCOSE SERPL-MCNC: 97 MG/DL (ref 65–99)
HBA1C MFR BLD: 5.6 % (ref 0–5.6)
HCT VFR BLD AUTO: 44.5 % (ref 37–47)
HDLC SERPL-MCNC: 78 MG/DL
HGB BLD-MCNC: 14.1 G/DL (ref 12–16)
IMM GRANULOCYTES # BLD AUTO: 0.01 K/UL (ref 0–0.11)
IMM GRANULOCYTES NFR BLD AUTO: 0.2 % (ref 0–0.9)
IRON SATN MFR SERPL: 20 % (ref 15–55)
IRON SERPL-MCNC: 78 UG/DL (ref 40–170)
LDLC SERPL CALC-MCNC: 80 MG/DL
LYMPHOCYTES # BLD AUTO: 1.32 K/UL (ref 1–4.8)
LYMPHOCYTES NFR BLD: 22.5 % (ref 22–41)
MAGNESIUM SERPL-MCNC: 2.1 MG/DL (ref 1.5–2.5)
MCH RBC QN AUTO: 27.9 PG (ref 27–33)
MCHC RBC AUTO-ENTMCNC: 31.7 G/DL (ref 33.6–35)
MCV RBC AUTO: 88.1 FL (ref 81.4–97.8)
MONOCYTES # BLD AUTO: 0.35 K/UL (ref 0–0.85)
MONOCYTES NFR BLD AUTO: 6 % (ref 0–13.4)
NEUTROPHILS # BLD AUTO: 4.07 K/UL (ref 2–7.15)
NEUTROPHILS NFR BLD: 69.2 % (ref 44–72)
NRBC # BLD AUTO: 0 K/UL
NRBC BLD-RTO: 0 /100 WBC
PLATELET # BLD AUTO: 219 K/UL (ref 164–446)
PMV BLD AUTO: 10.7 FL (ref 9–12.9)
POTASSIUM SERPL-SCNC: 3.9 MMOL/L (ref 3.6–5.5)
PROT SERPL-MCNC: 6.4 G/DL (ref 6–8.2)
RBC # BLD AUTO: 5.05 M/UL (ref 4.2–5.4)
SODIUM SERPL-SCNC: 141 MMOL/L (ref 135–145)
T3FREE SERPL-MCNC: 3.29 PG/ML (ref 2.4–4.2)
TIBC SERPL-MCNC: 382 UG/DL (ref 250–450)
TRANSFERRIN SERPL-MCNC: 278 MG/DL (ref 200–370)
TRIGL SERPL-MCNC: 51 MG/DL (ref 0–149)
TSH SERPL DL<=0.005 MIU/L-ACNC: 1.84 UIU/ML (ref 0.38–5.33)
VIT B12 SERPL-MCNC: 472 PG/ML (ref 211–911)
WBC # BLD AUTO: 5.9 K/UL (ref 4.8–10.8)

## 2019-05-18 PROCEDURE — 84439 ASSAY OF FREE THYROXINE: CPT

## 2019-05-18 PROCEDURE — 84481 FREE ASSAY (FT-3): CPT

## 2019-05-18 PROCEDURE — 83036 HEMOGLOBIN GLYCOSYLATED A1C: CPT

## 2019-05-18 PROCEDURE — 82607 VITAMIN B-12: CPT

## 2019-05-18 PROCEDURE — 84207 ASSAY OF VITAMIN B-6: CPT

## 2019-05-18 PROCEDURE — 82728 ASSAY OF FERRITIN: CPT

## 2019-05-18 PROCEDURE — 36415 COLL VENOUS BLD VENIPUNCTURE: CPT

## 2019-05-18 PROCEDURE — 83735 ASSAY OF MAGNESIUM: CPT

## 2019-05-18 PROCEDURE — 80053 COMPREHEN METABOLIC PANEL: CPT

## 2019-05-18 PROCEDURE — 85025 COMPLETE CBC W/AUTO DIFF WBC: CPT

## 2019-05-18 PROCEDURE — 83550 IRON BINDING TEST: CPT

## 2019-05-18 PROCEDURE — 84443 ASSAY THYROID STIM HORMONE: CPT

## 2019-05-18 PROCEDURE — 82306 VITAMIN D 25 HYDROXY: CPT

## 2019-05-18 PROCEDURE — 84425 ASSAY OF VITAMIN B-1: CPT

## 2019-05-18 PROCEDURE — 80061 LIPID PANEL: CPT

## 2019-05-18 PROCEDURE — 84466 ASSAY OF TRANSFERRIN: CPT

## 2019-05-18 PROCEDURE — 83540 ASSAY OF IRON: CPT

## 2019-05-18 PROCEDURE — 82746 ASSAY OF FOLIC ACID SERUM: CPT

## 2019-05-21 LAB
T4 FREE SERPL DIALY-MCNC: 1.1 NG/DL (ref 1.1–2.4)
VIT B6 SERPL-MCNC: 57.3 NMOL/L (ref 20–125)

## 2019-05-22 LAB — VIT B1 BLD-MCNC: 126 NMOL/L (ref 70–180)

## 2022-09-29 NOTE — PROGRESS NOTES
· Placed discharge outreach phone call to patient s/p ER discharge 6/27/17.  Left voicemail providing my contact information and instructions to return my phone call with any questions or concerns.  
[FreeTextEntry1] : spirometry is normal\par 
[FreeTextEntry1] : spirometry is normal\par